# Patient Record
Sex: FEMALE | Race: WHITE | NOT HISPANIC OR LATINO | Employment: FULL TIME | URBAN - NONMETROPOLITAN AREA
[De-identification: names, ages, dates, MRNs, and addresses within clinical notes are randomized per-mention and may not be internally consistent; named-entity substitution may affect disease eponyms.]

---

## 2017-05-18 ENCOUNTER — TRANSCRIBE ORDERS (OUTPATIENT)
Dept: ADMINISTRATIVE | Facility: HOSPITAL | Age: 57
End: 2017-05-18

## 2017-05-18 DIAGNOSIS — M79.604 LEG PAIN, BILATERAL: Primary | ICD-10-CM

## 2017-05-18 DIAGNOSIS — R20.0 LOWER EXTREMITY NUMBNESS: ICD-10-CM

## 2017-05-18 DIAGNOSIS — M79.605 LEG PAIN, BILATERAL: Primary | ICD-10-CM

## 2017-05-22 ENCOUNTER — HOSPITAL ENCOUNTER (OUTPATIENT)
Dept: CARDIOLOGY | Facility: HOSPITAL | Age: 57
Discharge: HOME | End: 2017-05-22
Admitting: NURSE PRACTITIONER

## 2017-05-22 DIAGNOSIS — R20.0 LOWER EXTREMITY NUMBNESS: ICD-10-CM

## 2017-05-22 DIAGNOSIS — M79.605 LEG PAIN, BILATERAL: ICD-10-CM

## 2017-05-22 DIAGNOSIS — M79.604 LEG PAIN, BILATERAL: ICD-10-CM

## 2017-05-22 PROCEDURE — 93925 LOWER EXTREMITY STUDY: CPT | Performed by: RADIOLOGY

## 2017-05-22 PROCEDURE — 93925 LOWER EXTREMITY STUDY: CPT

## 2017-06-26 ENCOUNTER — APPOINTMENT (OUTPATIENT)
Dept: PREADMISSION TESTING | Facility: HOSPITAL | Age: 57
End: 2017-06-26

## 2017-06-26 ENCOUNTER — PREP FOR SURGERY (OUTPATIENT)
Dept: OTHER | Facility: HOSPITAL | Age: 57
End: 2017-06-26

## 2017-06-26 DIAGNOSIS — N81.11 MIDLINE CYSTOCELE: Primary | ICD-10-CM

## 2017-06-26 LAB
ABO GROUP BLD: NORMAL
ALBUMIN SERPL-MCNC: 4.4 G/DL (ref 3.5–5)
ALBUMIN/GLOB SERPL: 1.3 G/DL (ref 1.5–2.5)
ALP SERPL-CCNC: 66 U/L (ref 35–104)
ALT SERPL W P-5'-P-CCNC: 16 U/L (ref 10–36)
ANION GAP SERPL CALCULATED.3IONS-SCNC: 5.8 MMOL/L (ref 3.6–11.2)
AST SERPL-CCNC: 19 U/L (ref 10–30)
BASOPHILS # BLD AUTO: 0.02 10*3/MM3 (ref 0–0.3)
BASOPHILS NFR BLD AUTO: 0.2 % (ref 0–2)
BILIRUB SERPL-MCNC: 0.5 MG/DL (ref 0.2–1.8)
BLD GP AB SCN SERPL QL: NEGATIVE
BUN BLD-MCNC: 10 MG/DL (ref 7–21)
BUN/CREAT SERPL: 13.3 (ref 7–25)
CALCIUM SPEC-SCNC: 9.6 MG/DL (ref 7.7–10)
CHLORIDE SERPL-SCNC: 107 MMOL/L (ref 99–112)
CO2 SERPL-SCNC: 27.2 MMOL/L (ref 24.3–31.9)
CREAT BLD-MCNC: 0.75 MG/DL (ref 0.43–1.29)
DEPRECATED RDW RBC AUTO: 45.3 FL (ref 37–54)
EOSINOPHIL # BLD AUTO: 0.23 10*3/MM3 (ref 0–0.7)
EOSINOPHIL NFR BLD AUTO: 2.5 % (ref 0–5)
ERYTHROCYTE [DISTWIDTH] IN BLOOD BY AUTOMATED COUNT: 14 % (ref 11.5–14.5)
GFR SERPL CREATININE-BSD FRML MDRD: 80 ML/MIN/1.73
GLOBULIN UR ELPH-MCNC: 3.4 GM/DL
GLUCOSE BLD-MCNC: 104 MG/DL (ref 70–110)
HCT VFR BLD AUTO: 39.5 % (ref 37–47)
HGB BLD-MCNC: 12.6 G/DL (ref 12–16)
IMM GRANULOCYTES # BLD: 0.03 10*3/MM3 (ref 0–0.03)
IMM GRANULOCYTES NFR BLD: 0.3 % (ref 0–0.5)
LYMPHOCYTES # BLD AUTO: 2.75 10*3/MM3 (ref 1–3)
LYMPHOCYTES NFR BLD AUTO: 29.8 % (ref 21–51)
MCH RBC QN AUTO: 28.3 PG (ref 27–33)
MCHC RBC AUTO-ENTMCNC: 31.9 G/DL (ref 33–37)
MCV RBC AUTO: 88.6 FL (ref 80–94)
MONOCYTES # BLD AUTO: 0.62 10*3/MM3 (ref 0.1–0.9)
MONOCYTES NFR BLD AUTO: 6.7 % (ref 0–10)
NEUTROPHILS # BLD AUTO: 5.57 10*3/MM3 (ref 1.4–6.5)
NEUTROPHILS NFR BLD AUTO: 60.5 % (ref 30–70)
OSMOLALITY SERPL CALC.SUM OF ELEC: 278.7 MOSM/KG (ref 273–305)
PLATELET # BLD AUTO: 273 10*3/MM3 (ref 130–400)
PMV BLD AUTO: 10.1 FL (ref 6–10)
POTASSIUM BLD-SCNC: 3.6 MMOL/L (ref 3.5–5.3)
PROT SERPL-MCNC: 7.8 G/DL (ref 6–8)
RBC # BLD AUTO: 4.46 10*6/MM3 (ref 4.2–5.4)
RH BLD: POSITIVE
SODIUM BLD-SCNC: 140 MMOL/L (ref 135–153)
WBC NRBC COR # BLD: 9.22 10*3/MM3 (ref 4.5–12.5)

## 2017-06-26 RX ORDER — OMEPRAZOLE 20 MG/1
20 CAPSULE, DELAYED RELEASE ORAL DAILY
COMMUNITY
End: 2018-01-17 | Stop reason: ALTCHOICE

## 2017-06-26 RX ORDER — SODIUM CHLORIDE 0.9 % (FLUSH) 0.9 %
1-10 SYRINGE (ML) INJECTION AS NEEDED
Status: CANCELLED | OUTPATIENT
Start: 2017-06-26

## 2017-06-27 ENCOUNTER — ANESTHESIA (OUTPATIENT)
Dept: PERIOP | Facility: HOSPITAL | Age: 57
End: 2017-06-27

## 2017-06-27 ENCOUNTER — ANESTHESIA EVENT (OUTPATIENT)
Dept: PERIOP | Facility: HOSPITAL | Age: 57
End: 2017-06-27

## 2017-06-27 ENCOUNTER — HOSPITAL ENCOUNTER (OUTPATIENT)
Facility: HOSPITAL | Age: 57
Setting detail: OBSERVATION
Discharge: HOME | End: 2017-06-28
Attending: OBSTETRICS & GYNECOLOGY | Admitting: OBSTETRICS & GYNECOLOGY

## 2017-06-27 ENCOUNTER — APPOINTMENT (OUTPATIENT)
Dept: GENERAL RADIOLOGY | Facility: HOSPITAL | Age: 57
End: 2017-06-27
Attending: OBSTETRICS & GYNECOLOGY

## 2017-06-27 DIAGNOSIS — N81.9 PROLAPSE OF FEMALE PELVIC ORGANS: ICD-10-CM

## 2017-06-27 DIAGNOSIS — N81.11 MIDLINE CYSTOCELE: ICD-10-CM

## 2017-06-27 PROCEDURE — 25010000002 ONDANSETRON PER 1 MG: Performed by: OBSTETRICS & GYNECOLOGY

## 2017-06-27 PROCEDURE — G0378 HOSPITAL OBSERVATION PER HR: HCPCS

## 2017-06-27 PROCEDURE — 25010000002 PROPOFOL 10 MG/ML EMULSION: Performed by: NURSE ANESTHETIST, CERTIFIED REGISTERED

## 2017-06-27 PROCEDURE — 25010000002 KETOROLAC TROMETHAMINE PER 15 MG: Performed by: NURSE ANESTHETIST, CERTIFIED REGISTERED

## 2017-06-27 PROCEDURE — 25010000002 GENTAMICIN PER 80 MG: Performed by: OBSTETRICS & GYNECOLOGY

## 2017-06-27 PROCEDURE — 25010000002 FENTANYL CITRATE (PF) 100 MCG/2ML SOLUTION: Performed by: NURSE ANESTHETIST, CERTIFIED REGISTERED

## 2017-06-27 PROCEDURE — C1771 REP DEV, URINARY, W/SLING: HCPCS | Performed by: OBSTETRICS & GYNECOLOGY

## 2017-06-27 PROCEDURE — 25010000002 HYDROMORPHONE PER 4 MG: Performed by: NURSE ANESTHETIST, CERTIFIED REGISTERED

## 2017-06-27 PROCEDURE — 25010000002 DEXAMETHASONE PER 1 MG: Performed by: NURSE ANESTHETIST, CERTIFIED REGISTERED

## 2017-06-27 PROCEDURE — 25010000002 ONDANSETRON PER 1 MG: Performed by: NURSE ANESTHETIST, CERTIFIED REGISTERED

## 2017-06-27 PROCEDURE — 25010000002 NEOSTIGMINE 10 MG/10ML SOLUTION: Performed by: NURSE ANESTHETIST, CERTIFIED REGISTERED

## 2017-06-27 PROCEDURE — 25010000002 FUROSEMIDE PER 20 MG: Performed by: NURSE ANESTHETIST, CERTIFIED REGISTERED

## 2017-06-27 DEVICE — SLNG TVT EXACT CONTINENCE SYS BX/1EA: Type: IMPLANTABLE DEVICE | Status: FUNCTIONAL

## 2017-06-27 RX ORDER — IPRATROPIUM BROMIDE AND ALBUTEROL SULFATE 2.5; .5 MG/3ML; MG/3ML
3 SOLUTION RESPIRATORY (INHALATION) ONCE AS NEEDED
Status: DISCONTINUED | OUTPATIENT
Start: 2017-06-27 | End: 2017-06-27 | Stop reason: HOSPADM

## 2017-06-27 RX ORDER — FENTANYL CITRATE 50 UG/ML
INJECTION, SOLUTION INTRAMUSCULAR; INTRAVENOUS AS NEEDED
Status: DISCONTINUED | OUTPATIENT
Start: 2017-06-27 | End: 2017-06-27 | Stop reason: SURG

## 2017-06-27 RX ORDER — SODIUM CHLORIDE 9 MG/ML
INJECTION, SOLUTION INTRAVENOUS AS NEEDED
Status: DISCONTINUED | OUTPATIENT
Start: 2017-06-27 | End: 2017-06-27 | Stop reason: HOSPADM

## 2017-06-27 RX ORDER — SODIUM CHLORIDE, SODIUM LACTATE, POTASSIUM CHLORIDE, CALCIUM CHLORIDE 600; 310; 30; 20 MG/100ML; MG/100ML; MG/100ML; MG/100ML
100 INJECTION, SOLUTION INTRAVENOUS CONTINUOUS
Status: DISCONTINUED | OUTPATIENT
Start: 2017-06-27 | End: 2017-06-28 | Stop reason: HOSPADM

## 2017-06-27 RX ORDER — SODIUM CHLORIDE 0.9 % (FLUSH) 0.9 %
1-10 SYRINGE (ML) INJECTION AS NEEDED
Status: DISCONTINUED | OUTPATIENT
Start: 2017-06-27 | End: 2017-06-27 | Stop reason: HOSPADM

## 2017-06-27 RX ORDER — PANTOPRAZOLE SODIUM 40 MG/1
40 TABLET, DELAYED RELEASE ORAL EVERY MORNING
Status: DISCONTINUED | OUTPATIENT
Start: 2017-06-27 | End: 2017-06-28 | Stop reason: HOSPADM

## 2017-06-27 RX ORDER — GLYCOPYRROLATE 0.2 MG/ML
INJECTION INTRAMUSCULAR; INTRAVENOUS AS NEEDED
Status: DISCONTINUED | OUTPATIENT
Start: 2017-06-27 | End: 2017-06-27 | Stop reason: SURG

## 2017-06-27 RX ORDER — ONDANSETRON 2 MG/ML
4 INJECTION INTRAMUSCULAR; INTRAVENOUS EVERY 6 HOURS PRN
Status: DISCONTINUED | OUTPATIENT
Start: 2017-06-27 | End: 2017-06-28 | Stop reason: HOSPADM

## 2017-06-27 RX ORDER — ONDANSETRON 4 MG/1
4 TABLET, ORALLY DISINTEGRATING ORAL EVERY 6 HOURS PRN
Status: DISCONTINUED | OUTPATIENT
Start: 2017-06-27 | End: 2017-06-28 | Stop reason: HOSPADM

## 2017-06-27 RX ORDER — FUROSEMIDE 10 MG/ML
INJECTION INTRAMUSCULAR; INTRAVENOUS AS NEEDED
Status: DISCONTINUED | OUTPATIENT
Start: 2017-06-27 | End: 2017-06-27 | Stop reason: SURG

## 2017-06-27 RX ORDER — OXYCODONE HYDROCHLORIDE AND ACETAMINOPHEN 5; 325 MG/1; MG/1
1 TABLET ORAL ONCE AS NEEDED
Status: DISCONTINUED | OUTPATIENT
Start: 2017-06-27 | End: 2017-06-27 | Stop reason: HOSPADM

## 2017-06-27 RX ORDER — FENTANYL CITRATE 50 UG/ML
50 INJECTION, SOLUTION INTRAMUSCULAR; INTRAVENOUS
Status: DISCONTINUED | OUTPATIENT
Start: 2017-06-27 | End: 2017-06-27 | Stop reason: HOSPADM

## 2017-06-27 RX ORDER — BUPIVACAINE HYDROCHLORIDE AND EPINEPHRINE 5; 5 MG/ML; UG/ML
INJECTION, SOLUTION EPIDURAL; INTRACAUDAL; PERINEURAL AS NEEDED
Status: DISCONTINUED | OUTPATIENT
Start: 2017-06-27 | End: 2017-06-27 | Stop reason: HOSPADM

## 2017-06-27 RX ORDER — ONDANSETRON 2 MG/ML
INJECTION INTRAMUSCULAR; INTRAVENOUS AS NEEDED
Status: DISCONTINUED | OUTPATIENT
Start: 2017-06-27 | End: 2017-06-27 | Stop reason: SURG

## 2017-06-27 RX ORDER — NALOXONE HCL 0.4 MG/ML
0.1 VIAL (ML) INJECTION
Status: DISCONTINUED | OUTPATIENT
Start: 2017-06-27 | End: 2017-06-28 | Stop reason: HOSPADM

## 2017-06-27 RX ORDER — LIDOCAINE HYDROCHLORIDE 20 MG/ML
INJECTION, SOLUTION INFILTRATION; PERINEURAL AS NEEDED
Status: DISCONTINUED | OUTPATIENT
Start: 2017-06-27 | End: 2017-06-27 | Stop reason: SURG

## 2017-06-27 RX ORDER — MAGNESIUM HYDROXIDE 1200 MG/15ML
LIQUID ORAL AS NEEDED
Status: DISCONTINUED | OUTPATIENT
Start: 2017-06-27 | End: 2017-06-27 | Stop reason: HOSPADM

## 2017-06-27 RX ORDER — KETOROLAC TROMETHAMINE 30 MG/ML
15 INJECTION, SOLUTION INTRAMUSCULAR; INTRAVENOUS EVERY 6 HOURS PRN
Status: DISCONTINUED | OUTPATIENT
Start: 2017-06-27 | End: 2017-06-28 | Stop reason: HOSPADM

## 2017-06-27 RX ORDER — NEOSTIGMINE METHYLSULFATE 1 MG/ML
INJECTION, SOLUTION INTRAVENOUS AS NEEDED
Status: DISCONTINUED | OUTPATIENT
Start: 2017-06-27 | End: 2017-06-27 | Stop reason: SURG

## 2017-06-27 RX ORDER — CLINDAMYCIN PHOSPHATE 600 MG/50ML
600 INJECTION INTRAVENOUS ONCE
Status: COMPLETED | OUTPATIENT
Start: 2017-06-27 | End: 2017-06-27

## 2017-06-27 RX ORDER — ROCURONIUM BROMIDE 10 MG/ML
INJECTION, SOLUTION INTRAVENOUS AS NEEDED
Status: DISCONTINUED | OUTPATIENT
Start: 2017-06-27 | End: 2017-06-27 | Stop reason: SURG

## 2017-06-27 RX ORDER — ACETAMINOPHEN 325 MG/1
650 TABLET ORAL EVERY 4 HOURS PRN
Status: DISCONTINUED | OUTPATIENT
Start: 2017-06-27 | End: 2017-06-28 | Stop reason: HOSPADM

## 2017-06-27 RX ORDER — ALBUTEROL SULFATE 90 UG/1
AEROSOL, METERED RESPIRATORY (INHALATION) AS NEEDED
Status: DISCONTINUED | OUTPATIENT
Start: 2017-06-27 | End: 2017-06-27 | Stop reason: SURG

## 2017-06-27 RX ORDER — SODIUM CHLORIDE, SODIUM LACTATE, POTASSIUM CHLORIDE, CALCIUM CHLORIDE 600; 310; 30; 20 MG/100ML; MG/100ML; MG/100ML; MG/100ML
125 INJECTION, SOLUTION INTRAVENOUS CONTINUOUS
Status: DISCONTINUED | OUTPATIENT
Start: 2017-06-27 | End: 2017-06-28 | Stop reason: HOSPADM

## 2017-06-27 RX ORDER — KETOROLAC TROMETHAMINE 30 MG/ML
INJECTION, SOLUTION INTRAMUSCULAR; INTRAVENOUS AS NEEDED
Status: DISCONTINUED | OUTPATIENT
Start: 2017-06-27 | End: 2017-06-27 | Stop reason: SURG

## 2017-06-27 RX ORDER — FAMOTIDINE 10 MG/ML
INJECTION, SOLUTION INTRAVENOUS AS NEEDED
Status: DISCONTINUED | OUTPATIENT
Start: 2017-06-27 | End: 2017-06-27 | Stop reason: SURG

## 2017-06-27 RX ORDER — OXYCODONE HYDROCHLORIDE AND ACETAMINOPHEN 5; 325 MG/1; MG/1
1 TABLET ORAL EVERY 4 HOURS PRN
Status: DISCONTINUED | OUTPATIENT
Start: 2017-06-27 | End: 2017-06-28 | Stop reason: HOSPADM

## 2017-06-27 RX ORDER — ONDANSETRON 2 MG/ML
4 INJECTION INTRAMUSCULAR; INTRAVENOUS ONCE AS NEEDED
Status: DISCONTINUED | OUTPATIENT
Start: 2017-06-27 | End: 2017-06-27 | Stop reason: HOSPADM

## 2017-06-27 RX ORDER — DEXAMETHASONE SODIUM PHOSPHATE 4 MG/ML
INJECTION, SOLUTION INTRA-ARTICULAR; INTRALESIONAL; INTRAMUSCULAR; INTRAVENOUS; SOFT TISSUE AS NEEDED
Status: DISCONTINUED | OUTPATIENT
Start: 2017-06-27 | End: 2017-06-27 | Stop reason: SURG

## 2017-06-27 RX ORDER — MEPERIDINE HYDROCHLORIDE 25 MG/ML
12.5 INJECTION INTRAMUSCULAR; INTRAVENOUS; SUBCUTANEOUS
Status: DISCONTINUED | OUTPATIENT
Start: 2017-06-27 | End: 2017-06-27 | Stop reason: HOSPADM

## 2017-06-27 RX ORDER — HYDROMORPHONE HCL 110MG/55ML
PATIENT CONTROLLED ANALGESIA SYRINGE INTRAVENOUS AS NEEDED
Status: DISCONTINUED | OUTPATIENT
Start: 2017-06-27 | End: 2017-06-27 | Stop reason: SURG

## 2017-06-27 RX ORDER — PROPOFOL 10 MG/ML
VIAL (ML) INTRAVENOUS AS NEEDED
Status: DISCONTINUED | OUTPATIENT
Start: 2017-06-27 | End: 2017-06-27 | Stop reason: SURG

## 2017-06-27 RX ORDER — ONDANSETRON 4 MG/1
4 TABLET, FILM COATED ORAL EVERY 6 HOURS PRN
Status: DISCONTINUED | OUTPATIENT
Start: 2017-06-27 | End: 2017-06-28 | Stop reason: HOSPADM

## 2017-06-27 RX ADMIN — SODIUM CHLORIDE, POTASSIUM CHLORIDE, SODIUM LACTATE AND CALCIUM CHLORIDE: 600; 310; 30; 20 INJECTION, SOLUTION INTRAVENOUS at 11:01

## 2017-06-27 RX ADMIN — DEXAMETHASONE SODIUM PHOSPHATE 8 MG: 4 INJECTION, SOLUTION INTRAMUSCULAR; INTRAVENOUS at 10:23

## 2017-06-27 RX ADMIN — KETOROLAC TROMETHAMINE 30 MG: 30 INJECTION, SOLUTION INTRAMUSCULAR; INTRAVENOUS at 12:14

## 2017-06-27 RX ADMIN — ONDANSETRON 4 MG: 2 INJECTION, SOLUTION INTRAMUSCULAR; INTRAVENOUS at 15:05

## 2017-06-27 RX ADMIN — GENTAMICIN SULFATE 380 MG: 40 INJECTION, SOLUTION INTRAMUSCULAR; INTRAVENOUS at 10:19

## 2017-06-27 RX ADMIN — SODIUM CHLORIDE, POTASSIUM CHLORIDE, SODIUM LACTATE AND CALCIUM CHLORIDE: 600; 310; 30; 20 INJECTION, SOLUTION INTRAVENOUS at 12:05

## 2017-06-27 RX ADMIN — PANTOPRAZOLE SODIUM 40 MG: 40 TABLET, DELAYED RELEASE ORAL at 16:51

## 2017-06-27 RX ADMIN — FUROSEMIDE 20 MG: 10 INJECTION, SOLUTION INTRAMUSCULAR; INTRAVENOUS at 12:08

## 2017-06-27 RX ADMIN — GLYCOPYRROLATE 0.6 MG: 0.2 INJECTION, SOLUTION INTRAMUSCULAR; INTRAVENOUS at 12:19

## 2017-06-27 RX ADMIN — PROPOFOL 200 MG: 10 INJECTION, EMULSION INTRAVENOUS at 10:23

## 2017-06-27 RX ADMIN — HYDROMORPHONE HYDROCHLORIDE 1 MG: 2 INJECTION, SOLUTION INTRAMUSCULAR; INTRAVENOUS; SUBCUTANEOUS at 11:59

## 2017-06-27 RX ADMIN — NEOSTIGMINE METHYLSULFATE 3 MG: 1 INJECTION, SOLUTION INTRAVENOUS at 12:19

## 2017-06-27 RX ADMIN — HYDROMORPHONE HYDROCHLORIDE 0.5 MG: 2 INJECTION, SOLUTION INTRAMUSCULAR; INTRAVENOUS; SUBCUTANEOUS at 12:25

## 2017-06-27 RX ADMIN — FENTANYL CITRATE 100 MCG: 50 INJECTION INTRAMUSCULAR; INTRAVENOUS at 10:19

## 2017-06-27 RX ADMIN — CLINDAMYCIN PHOSPHATE 600 MG: 12 INJECTION, SOLUTION INTRAVENOUS at 10:19

## 2017-06-27 RX ADMIN — FENTANYL CITRATE 50 MCG: 50 INJECTION INTRAMUSCULAR; INTRAVENOUS at 11:59

## 2017-06-27 RX ADMIN — FENTANYL CITRATE 100 MCG: 50 INJECTION INTRAMUSCULAR; INTRAVENOUS at 10:23

## 2017-06-27 RX ADMIN — ROCURONIUM BROMIDE 40 MG: 10 INJECTION INTRAVENOUS at 10:23

## 2017-06-27 RX ADMIN — ALBUTEROL SULFATE 4 PUFF: 90 AEROSOL, METERED RESPIRATORY (INHALATION) at 12:22

## 2017-06-27 RX ADMIN — SODIUM CHLORIDE, POTASSIUM CHLORIDE, SODIUM LACTATE AND CALCIUM CHLORIDE 125 ML/HR: 600; 310; 30; 20 INJECTION, SOLUTION INTRAVENOUS at 09:53

## 2017-06-27 RX ADMIN — LIDOCAINE HYDROCHLORIDE 60 MG: 20 INJECTION, SOLUTION INFILTRATION; PERINEURAL at 10:23

## 2017-06-27 RX ADMIN — ROCURONIUM BROMIDE 10 MG: 10 INJECTION INTRAVENOUS at 11:59

## 2017-06-27 RX ADMIN — HYDROMORPHONE HYDROCHLORIDE 0.5 MG: 2 INJECTION, SOLUTION INTRAMUSCULAR; INTRAVENOUS; SUBCUTANEOUS at 12:21

## 2017-06-27 RX ADMIN — ONDANSETRON 4 MG: 2 INJECTION, SOLUTION INTRAMUSCULAR; INTRAVENOUS at 12:14

## 2017-06-27 RX ADMIN — FLUORESCEIN SODIUM 1 ML: 100 INJECTION INTRAVENOUS at 12:02

## 2017-06-27 RX ADMIN — SODIUM CHLORIDE, POTASSIUM CHLORIDE, SODIUM LACTATE AND CALCIUM CHLORIDE 100 ML/HR: 600; 310; 30; 20 INJECTION, SOLUTION INTRAVENOUS at 14:57

## 2017-06-27 RX ADMIN — FAMOTIDINE 20 MG: 10 INJECTION, SOLUTION INTRAVENOUS at 10:19

## 2017-06-27 NOTE — ANESTHESIA PROCEDURE NOTES
Airway  Urgency: elective    Date/Time: 6/27/2017 10:24 AM  Airway not difficult    General Information and Staff    Patient location during procedure: OR  Anesthesiologist: NORBERT WAGNER  CRNA: DARYL BOB    Indications and Patient Condition  Indications for airway management: airway protection    Preoxygenated: yes  MILS maintained throughout  Mask difficulty assessment: 2 - vent by mask + OA or adjuvant +/- NMBA    Final Airway Details  Final airway type: endotracheal airway      Successful airway: ETT  Cuffed: yes   Successful intubation technique: direct laryngoscopy  Facilitating devices/methods: intubating stylet  Endotracheal tube insertion site: oral  Blade: Florentino  Blade size: #3  ETT size: 7.5 mm  Cormack-Lehane Classification: grade IIa - partial view of glottis  Placement verified by: chest auscultation, capnometry and palpation of cuff   Cuff volume (mL): 6  Measured from: lips  ETT to lips (cm): 22  Number of attempts at approach: 1    Additional Comments  Dentition as preop. No complications noted. Patient tolerated well.  ETT secured.    Patient has chipped front teeth prior to induction.

## 2017-06-27 NOTE — ANESTHESIA PREPROCEDURE EVALUATION
Anesthesia Evaluation     Patient summary reviewed and Nursing notes reviewed   history of anesthetic complications: prolonged sedation  NPO Solid Status: > 8 hours  NPO Liquid Status: > 8 hours     Airway   Mallampati: II  TM distance: >3 FB  Neck ROM: full  no difficulty expected  Dental - normal exam         Pulmonary - normal exam   (+) sleep apnea,   (-) asthma, not a smoker  Cardiovascular - normal exam  Exercise tolerance: good (4-7 METS)    NYHA Classification: II    (+) past MI , dysrhythmias,   (-) hypertension, angina, CHF, hyperlipidemia      Neuro/Psych- negative ROS  (-) seizures, CVA  GI/Hepatic/Renal/Endo    (+) obesity,  GERD well controlled,   (-) diabetes, hypothyroidism    Musculoskeletal     Abdominal  - normal exam    Bowel sounds: normal.   Substance History - negative use     OB/GYN negative ob/gyn ROS         Other   (+) arthritis                                   Anesthesia Plan    ASA 3     general     Anesthetic plan and risks discussed with patient.  Use of blood products discussed with patient  Consented to blood products.

## 2017-06-27 NOTE — ANESTHESIA POSTPROCEDURE EVALUATION
Patient: Brianna Jones    Procedure Summary     Date Anesthesia Start Anesthesia Stop Room / Location    06/27/17 1019 1237 [facility] OR 04 / [facility]       Procedure Diagnosis Surgeon Provider    TOTAL LAPAROSCOPIC HYSTERECTOMY BSO WITH [employer] [employer] SI ROBOT (N/A Abdomen); ANTERIOR  REPAIR (N/A Vagina); RETROPUBIC TENSION FREE VAGINAL TAPING (N/A Vagina) (R32) MD Salo Tovar MD          Anesthesia Type: general  Last vitals  /69 (06/27/17 1303)    Temp 97.4 °F (36.3 °C) (06/27/17 1303)    Pulse 60 (06/27/17 1303)   Resp 13 (06/27/17 1303)    SpO2 97 % (06/27/17 1303)      Post Anesthesia Care and Evaluation    Patient location during evaluation: PACU  Patient participation: complete - patient participated  Level of consciousness: awake and alert  Pain score: 1  Pain management: adequate  Airway patency: patent  Anesthetic complications: No anesthetic complications  PONV Status: controlled  Cardiovascular status: acceptable  Respiratory status: acceptable  Hydration status: acceptable

## 2017-06-28 VITALS
SYSTOLIC BLOOD PRESSURE: 149 MMHG | TEMPERATURE: 98.9 F | WEIGHT: 230 LBS | BODY MASS INDEX: 38.32 KG/M2 | RESPIRATION RATE: 20 BRPM | HEART RATE: 71 BPM | OXYGEN SATURATION: 97 % | DIASTOLIC BLOOD PRESSURE: 83 MMHG | HEIGHT: 65 IN

## 2017-06-28 LAB
DEPRECATED RDW RBC AUTO: 43.4 FL (ref 37–54)
ERYTHROCYTE [DISTWIDTH] IN BLOOD BY AUTOMATED COUNT: 13.8 % (ref 11.5–14.5)
HCT VFR BLD AUTO: 36.5 % (ref 37–47)
HGB BLD-MCNC: 11.7 G/DL (ref 12–16)
MCH RBC QN AUTO: 28.3 PG (ref 27–33)
MCHC RBC AUTO-ENTMCNC: 32.1 G/DL (ref 33–37)
MCV RBC AUTO: 88.2 FL (ref 80–94)
PLATELET # BLD AUTO: 265 10*3/MM3 (ref 130–400)
PMV BLD AUTO: 9.9 FL (ref 6–10)
RBC # BLD AUTO: 4.14 10*6/MM3 (ref 4.2–5.4)
WBC NRBC COR # BLD: 18.22 10*3/MM3 (ref 4.5–12.5)

## 2017-06-28 PROCEDURE — G0378 HOSPITAL OBSERVATION PER HR: HCPCS

## 2017-06-28 PROCEDURE — 94799 UNLISTED PULMONARY SVC/PX: CPT

## 2017-06-28 PROCEDURE — 85027 COMPLETE CBC AUTOMATED: CPT | Performed by: OBSTETRICS & GYNECOLOGY

## 2017-06-28 RX ORDER — ESTRADIOL 2 MG/1
2 TABLET ORAL DAILY
Qty: 100 TABLET | Refills: 3 | Status: SHIPPED | OUTPATIENT
Start: 2017-06-28 | End: 2018-01-17 | Stop reason: ALTCHOICE

## 2017-06-28 RX ORDER — IBUPROFEN 600 MG/1
600 TABLET ORAL EVERY 6 HOURS PRN
Qty: 30 TABLET | Refills: 0 | Status: SHIPPED | OUTPATIENT
Start: 2017-06-28 | End: 2017-07-28

## 2017-06-28 RX ORDER — NITROFURANTOIN MACROCRYSTALS 100 MG/1
100 CAPSULE ORAL 2 TIMES DAILY
Qty: 14 CAPSULE | Refills: 0 | Status: SHIPPED | OUTPATIENT
Start: 2017-06-28 | End: 2017-07-05

## 2017-06-28 RX ORDER — DOCUSATE SODIUM 100 MG/1
100 CAPSULE, LIQUID FILLED ORAL 2 TIMES DAILY
Qty: 60 CAPSULE | Refills: 0 | Status: SHIPPED | OUTPATIENT
Start: 2017-06-28 | End: 2017-07-28

## 2017-07-03 LAB
LAB AP CASE REPORT: NORMAL
Lab: NORMAL
PATH REPORT.FINAL DX SPEC: NORMAL

## 2017-07-12 ENCOUNTER — TRANSCRIBE ORDERS (OUTPATIENT)
Dept: ADMINISTRATIVE | Facility: HOSPITAL | Age: 57
End: 2017-07-12

## 2017-07-12 DIAGNOSIS — R10.9 RIGHT FLANK PAIN: Primary | ICD-10-CM

## 2017-07-14 ENCOUNTER — HOSPITAL ENCOUNTER (OUTPATIENT)
Dept: CT IMAGING | Facility: HOSPITAL | Age: 57
Discharge: HOME | End: 2017-07-14
Attending: OBSTETRICS & GYNECOLOGY

## 2017-07-14 DIAGNOSIS — R10.9 RIGHT FLANK PAIN: ICD-10-CM

## 2017-09-19 ENCOUNTER — TRANSCRIBE ORDERS (OUTPATIENT)
Dept: ADMINISTRATIVE | Facility: HOSPITAL | Age: 57
End: 2017-09-19

## 2017-09-19 DIAGNOSIS — R31.9 HEMATURIA: Primary | ICD-10-CM

## 2017-09-20 ENCOUNTER — TRANSCRIBE ORDERS (OUTPATIENT)
Dept: ADMINISTRATIVE | Facility: HOSPITAL | Age: 57
End: 2017-09-20

## 2017-09-20 ENCOUNTER — HOSPITAL ENCOUNTER (OUTPATIENT)
Dept: GENERAL RADIOLOGY | Facility: HOSPITAL | Age: 57
Discharge: HOME | End: 2017-09-20
Admitting: NURSE PRACTITIONER

## 2017-09-20 DIAGNOSIS — M25.561 RIGHT KNEE PAIN, UNSPECIFIED CHRONICITY: ICD-10-CM

## 2017-09-20 DIAGNOSIS — M25.561 RIGHT KNEE PAIN, UNSPECIFIED CHRONICITY: Primary | ICD-10-CM

## 2017-09-20 PROCEDURE — 73562 X-RAY EXAM OF KNEE 3: CPT | Performed by: RADIOLOGY

## 2017-09-20 PROCEDURE — 73562 X-RAY EXAM OF KNEE 3: CPT

## 2017-09-27 ENCOUNTER — OFFICE VISIT (OUTPATIENT)
Dept: UROLOGY | Facility: CLINIC | Age: 57
End: 2017-09-27

## 2017-09-27 DIAGNOSIS — R31.9 HEMATURIA: Primary | ICD-10-CM

## 2017-09-27 DIAGNOSIS — R10.31 RIGHT LOWER QUADRANT PAIN: ICD-10-CM

## 2017-09-27 LAB
ANION GAP SERPL CALCULATED.3IONS-SCNC: 7.3 MMOL/L (ref 3.6–11.2)
BILIRUB BLD-MCNC: NEGATIVE MG/DL
BUN BLD-MCNC: 13 MG/DL (ref 7–21)
BUN/CREAT SERPL: 15.7 (ref 7–25)
CALCIUM SPEC-SCNC: 9.7 MG/DL (ref 7.7–10)
CHLORIDE SERPL-SCNC: 111 MMOL/L (ref 99–112)
CLARITY, POC: CLEAR
CO2 SERPL-SCNC: 23.7 MMOL/L (ref 24.3–31.9)
COLOR UR: YELLOW
CREAT BLD-MCNC: 0.83 MG/DL (ref 0.43–1.29)
GFR SERPL CREATININE-BSD FRML MDRD: 71 ML/MIN/1.73
GLUCOSE BLD-MCNC: 98 MG/DL (ref 70–110)
GLUCOSE UR STRIP-MCNC: NEGATIVE MG/DL
KETONES UR QL: NEGATIVE
LEUKOCYTE EST, POC: ABNORMAL
NITRITE UR-MCNC: NEGATIVE MG/ML
OSMOLALITY SERPL CALC.SUM OF ELEC: 283.2 MOSM/KG (ref 273–305)
PH UR: 5 [PH] (ref 5–8)
POTASSIUM BLD-SCNC: 4.1 MMOL/L (ref 3.5–5.3)
PROT UR STRIP-MCNC: ABNORMAL MG/DL
RBC # UR STRIP: ABNORMAL /UL
SODIUM BLD-SCNC: 142 MMOL/L (ref 135–153)
SP GR UR: 1.02 (ref 1–1.03)
UROBILINOGEN UR QL: NORMAL

## 2017-09-27 PROCEDURE — 99214 OFFICE O/P EST MOD 30 MIN: CPT | Performed by: NURSE PRACTITIONER

## 2017-09-27 PROCEDURE — 81003 URINALYSIS AUTO W/O SCOPE: CPT | Performed by: NURSE PRACTITIONER

## 2017-09-27 PROCEDURE — 36415 COLL VENOUS BLD VENIPUNCTURE: CPT | Performed by: NURSE PRACTITIONER

## 2017-09-27 PROCEDURE — 51798 US URINE CAPACITY MEASURE: CPT | Performed by: NURSE PRACTITIONER

## 2017-09-27 PROCEDURE — 80048 BASIC METABOLIC PNL TOTAL CA: CPT | Performed by: NURSE PRACTITIONER

## 2017-09-27 RX ORDER — LISINOPRIL 10 MG/1
TABLET ORAL
COMMUNITY
Start: 2017-09-19 | End: 2018-01-17 | Stop reason: ALTCHOICE

## 2017-09-27 NOTE — PROGRESS NOTES
Chief Complaint:          Chief Complaint   Patient presents with   • Blood in Urine       HPI:   56 y.o. female being seen in the office today for microhematuria that has been present for the past x 6+ months.  Denies any history of stones in herself or her family.  States her grandmother had colon cancer.  Non-smoker.  Does complain of some right lower quadrant tenderness.  Has had several urine cultures by her PCP for the microhematuria with negative results.  UA today shows small amount of leukocytes and small amount of blood.  She has never had any urological work-up or imaging completed for the microhematuria.  PVR today is 0 mL.    HPI        Past Medical History:        Past Medical History:   Diagnosis Date   • Arthritis    • Constipation    • Frequency of urination    • GERD (gastroesophageal reflux disease)    • Tremor          Current Meds:     Current Outpatient Prescriptions   Medication Sig Dispense Refill   • lisinopril (PRINIVIL,ZESTRIL) 10 MG tablet      • omeprazole (priLOSEC) 20 MG capsule Take 20 mg by mouth Daily.     • estradiol (ESTRACE) 2 MG tablet Take 1 tablet by mouth Daily. 100 tablet 3   • HYDROcodone-acetaminophen (NORCO) 5-325 MG per tablet Take 1 to 2 tablets by mouth Every 4 (Four) Hours As Needed. 40 tablet 0     No current facility-administered medications for this visit.         Allergies:      Allergies   Allergen Reactions   • Amoxicillin Dermatitis   • Penicillins    • Sulfa Antibiotics         Past Surgical History:     Past Surgical History:   Procedure Laterality Date   • ABDOMINAL SURGERY     • ANTERIOR VAGINAL REPAIR N/A 6/27/2017    Procedure: ANTERIOR  REPAIR;  Surgeon: Jose Ellison MD;  Location: [facility];  Service:    • BREAST SURGERY Right    • LAPAROSCOPIC CHOLECYSTECTOMY     • LAPAROSCOPIC TUBAL LIGATION     • TONSILLECTOMY     • TOTAL LAPAROSCOPIC HYSTERECTOMY N/A 6/27/2017    Procedure: TOTAL LAPAROSCOPIC HYSTERECTOMY BSO WITH DAVINCI SI ROBOT;  Surgeon:  Jose Ellison MD;  Location: [facility];  Service:    • TRANSVAGINAL TAPING SUSPENSION N/A 6/27/2017    Procedure: RETROPUBIC TENSION FREE VAGINAL TAPING;  Surgeon: Jose Ellison MD;  Location: [facility];  Service:          Social History:     Social History     Social History   • Marital status:      Spouse name: N/A   • Number of children: N/A   • Years of education: N/A     Occupational History   • Not on file.     Social History Main Topics   • Smoking status: Never Smoker   • Smokeless tobacco: Never Used   • Alcohol use No   • Drug use: No   • Sexual activity: Defer     Other Topics Concern   • Not on file     Social History Narrative       Family History:     No family history on file.    Review of Systems:     Review of Systems   Constitutional: Negative for chills, fatigue and fever.   Respiratory: Negative for cough, shortness of breath and wheezing.    Cardiovascular: Negative for leg swelling.   Gastrointestinal: Positive for abdominal pain. Negative for nausea and vomiting.   Genitourinary: Positive for hematuria.   Musculoskeletal: Positive for joint swelling. Negative for back pain.   Neurological: Positive for dizziness. Negative for headaches.   Psychiatric/Behavioral: Negative for confusion.       Physical Exam:     Physical Exam   Constitutional: She is oriented to person, place, and time. She appears well-developed and well-nourished. No distress.   Abdominal: Soft. Bowel sounds are normal. She exhibits no distension and no mass. There is no tenderness. There is no rebound and no guarding. No hernia.   Genitourinary: Vagina normal.   Genitourinary Comments: Status post hysterectomy   Neurological: She is alert and oriented to person, place, and time.   Skin: Skin is warm and dry. No rash noted. She is not diaphoretic. No erythema. No pallor.   Psychiatric: She has a normal mood and affect. Her behavior is normal. Judgment and thought content normal.   Nursing note and vitals  reviewed.      Procedure:     No notes on file      Assessment:     Encounter Diagnosis   Name Primary?   • Hematuria Yes     Orders Placed This Encounter   Procedures   • Bladder Scan   • POC Urinalysis Dipstick, Automated       Plan:   Recommend she have a CT scan of the abdomen and pelvis with and without contrast on a renal mass protocol and a cystoscopy by Dr. Fernando or Dr. Castañeda to evaluate the cause of the persistent microhematuria.  Discussed the procedures with the patient and she is agreeable.  She will be seen in follow-up after the procedures.    Counseling was given to patient for the following topics diagnostic results, patient and family education, impressions and risks and benefits of treatment options. and the interim medical history and current results were reviewed.  A treatment plan with follow-up was made. Total time of the encounter was 28 minutes and 28 minutes were spent discussing Hematuria [R31.9] face-to-face.       This document has been electronically signed by STEPHEN Zuniga September 27, 2017 2:04 PM

## 2017-09-28 ENCOUNTER — APPOINTMENT (OUTPATIENT)
Dept: ULTRASOUND IMAGING | Facility: HOSPITAL | Age: 57
End: 2017-09-28

## 2017-10-04 ENCOUNTER — HOSPITAL ENCOUNTER (OUTPATIENT)
Dept: ULTRASOUND IMAGING | Facility: HOSPITAL | Age: 57
Discharge: HOME | End: 2017-10-04
Admitting: NURSE PRACTITIONER

## 2017-10-04 DIAGNOSIS — R31.9 HEMATURIA: ICD-10-CM

## 2017-10-04 PROCEDURE — 76775 US EXAM ABDO BACK WALL LIM: CPT | Performed by: RADIOLOGY

## 2017-10-04 PROCEDURE — 76775 US EXAM ABDO BACK WALL LIM: CPT

## 2017-10-12 ENCOUNTER — APPOINTMENT (OUTPATIENT)
Dept: CT IMAGING | Facility: HOSPITAL | Age: 57
End: 2017-10-12

## 2017-10-18 ENCOUNTER — OFFICE VISIT (OUTPATIENT)
Dept: ORTHOPEDIC SURGERY | Facility: CLINIC | Age: 57
End: 2017-10-18

## 2017-10-18 VITALS — HEIGHT: 65 IN | BODY MASS INDEX: 36.65 KG/M2 | WEIGHT: 220 LBS

## 2017-10-18 DIAGNOSIS — M17.0 PRIMARY OSTEOARTHRITIS OF BOTH KNEES: Primary | ICD-10-CM

## 2017-10-18 PROCEDURE — 99203 OFFICE O/P NEW LOW 30 MIN: CPT | Performed by: ORTHOPAEDIC SURGERY

## 2017-10-18 PROCEDURE — 20610 DRAIN/INJ JOINT/BURSA W/O US: CPT | Performed by: ORTHOPAEDIC SURGERY

## 2017-10-18 RX ADMIN — LIDOCAINE HYDROCHLORIDE 2 ML: 20 INJECTION, SOLUTION INFILTRATION; PERINEURAL at 13:43

## 2017-10-18 RX ADMIN — METHYLPREDNISOLONE ACETATE 40 MG: 40 INJECTION, SUSPENSION INTRA-ARTICULAR; INTRALESIONAL; INTRAMUSCULAR; SOFT TISSUE at 13:43

## 2017-10-18 NOTE — PROGRESS NOTES
New Patient Visit        Patient: Brianna Jones  YOB: 1960  Date of encounter: 10/18/2017      History of Present Illness:   Brianna Jones is a 56 y.o.  female who is referred here by STEPHEN Cardenas for evaluation of bilateral knee pain.  She states the left knee is significantly worse than the right and been ongoing for several years.  She states is been getting more constant and progressively worse in the last several months.  She denies injury to the knee.  She complains of constant aching pain generalized throughout the knee.  She states her symptoms are worse with prolonged walking and standing, as well as stairs and squatting.  She is never taken any oral anti-inflammatories or ever had an injection.    PMH:   Patient Active Problem List   Diagnosis   • Midline cystocele     Past Medical History:   Diagnosis Date   • Arthritis    • Constipation    • Frequency of urination    • GERD (gastroesophageal reflux disease)    • Tremor        PSH:  Past Surgical History:   Procedure Laterality Date   • ABDOMINAL SURGERY     • ANTERIOR VAGINAL REPAIR N/A 6/27/2017    Procedure: ANTERIOR  REPAIR;  Surgeon: Jose Ellison MD;  Location: [facility] OR;  Service:    • BREAST SURGERY Right    • LAPAROSCOPIC CHOLECYSTECTOMY     • LAPAROSCOPIC TUBAL LIGATION     • TONSILLECTOMY     • TOTAL LAPAROSCOPIC HYSTERECTOMY N/A 6/27/2017    Procedure: TOTAL LAPAROSCOPIC HYSTERECTOMY BSO WITH DAVINCI SI ROBOT;  Surgeon: Jose Ellison MD;  Location: [facility] OR;  Service:    • TRANSVAGINAL TAPING SUSPENSION N/A 6/27/2017    Procedure: RETROPUBIC TENSION FREE VAGINAL TAPING;  Surgeon: Jose Ellison MD;  Location: [facility] OR;  Service:        Allergies:     Allergies   Allergen Reactions   • Amoxicillin Dermatitis   • Penicillins    • Sulfa Antibiotics        Medications:     Current Outpatient Prescriptions:   •  estradiol (ESTRACE) 2 MG tablet, Take 1 tablet by mouth Daily., Disp: 100 tablet, Rfl: 3  •   "lisinopril (PRINIVIL,ZESTRIL) 10 MG tablet, , Disp: , Rfl:   •  omeprazole (priLOSEC) 20 MG capsule, Take 20 mg by mouth Daily., Disp: , Rfl:     Social History:  Social History     Social History   • Marital status:      Spouse name: N/A   • Number of children: N/A   • Years of education: N/A     Occupational History   • Not on file.     Social History Main Topics   • Smoking status: Never Smoker   • Smokeless tobacco: Never Used   • Alcohol use No   • Drug use: No   • Sexual activity: Defer     Other Topics Concern   • Not on file     Social History Narrative       Family History:   History reviewed. No pertinent family history.    Review of Systems:   Review of Systems   Constitutional: Negative.    HENT: Negative.    Eyes: Negative.    Respiratory: Negative.    Cardiovascular: Negative.    Gastrointestinal: Negative.    Endocrine: Negative.    Genitourinary: Negative.    Musculoskeletal:        Pertinent positives mentioned in HPI   Skin: Negative.    Neurological: Negative.    Hematological: Negative.    Psychiatric/Behavioral: Negative.        Physical Exam: 56 y.o. female  General Appearance:    Alert and oriented x 3, cooperative, in no acute distress                   Vitals:    10/18/17 1300   Weight: 220 lb (99.8 kg)   Height: 65\" (165.1 cm)                Musculoskeletal: Examination of the bilateral knees reveals no effusion.  She has moderate medial joint line tenderness.  She has full range of motion with moderate crepitus of the left knee.  There is no instability with varus or valgus stressing.  Her neurovascular status is intact.    Radiology:     4 views of the bilateral knees were reviewed revealing medial compartment joint space narrowing with osteophyte formation.    Large Joint Arthrocentesis  Date/Time: 10/18/2017 1:43 PM  Consent given by: patient  Timeout: Immediately prior to procedure a time out was called to verify the correct patient, procedure, equipment, support staff and " site/side marked as required   Supporting Documentation  Indications: pain   Procedure Details  Location: knee - L knee  Needle size: 25 G  Approach: anterolateral  Medications administered: 40 mg methylPREDNISolone acetate 40 MG/ML; 2 mL lidocaine 2%  Patient tolerance: patient tolerated the procedure well with no immediate complications          Assessment    ICD-10-CM ICD-9-CM   1. Primary osteoarthritis of both knees M17.0 715.16       Plan:   A 56-year-old female with complaints of bilateral knee pain.  X-rays were reviewed today and although they are nonweightbearing films there does appear to be narrowing especially within the medial compartment.  There is also findings of arthritis within the patellofemoral joint.  She's never had any conservative treatment.  Given this today we will begin with intra-articular steroid injection.  Her left knee is currently more symptomatic than the right in today proceeded with 40 mg Depo-Medrol with lidocaine block intra-articularly into the left knee.  Monitor response the injection and she'll return back with recurrence of pain.  If she responds well to the cortisone she would likely.  Good candidate for viscous supplementation.    Written by, Bree VANN, acting as a scribe for STEPHEN Dos Santos

## 2017-10-19 RX ORDER — METHYLPREDNISOLONE ACETATE 40 MG/ML
40 INJECTION, SUSPENSION INTRA-ARTICULAR; INTRALESIONAL; INTRAMUSCULAR; SOFT TISSUE
Status: COMPLETED | OUTPATIENT
Start: 2017-10-18 | End: 2017-10-18

## 2017-10-19 RX ORDER — LIDOCAINE HYDROCHLORIDE 20 MG/ML
2 INJECTION, SOLUTION INFILTRATION; PERINEURAL
Status: COMPLETED | OUTPATIENT
Start: 2017-10-18 | End: 2017-10-18

## 2017-10-24 ENCOUNTER — HOSPITAL ENCOUNTER (OUTPATIENT)
Dept: CT IMAGING | Facility: HOSPITAL | Age: 57
Discharge: HOME | End: 2017-10-24
Admitting: NURSE PRACTITIONER

## 2017-10-24 DIAGNOSIS — R10.31 RIGHT LOWER QUADRANT PAIN: ICD-10-CM

## 2017-10-24 DIAGNOSIS — R31.9 HEMATURIA: ICD-10-CM

## 2017-10-24 PROCEDURE — 74178 CT ABD&PLV WO CNTR FLWD CNTR: CPT

## 2017-10-24 PROCEDURE — 0 IOPAMIDOL 61 % SOLUTION: Performed by: NURSE PRACTITIONER

## 2017-10-24 PROCEDURE — 74178 CT ABD&PLV WO CNTR FLWD CNTR: CPT | Performed by: RADIOLOGY

## 2017-10-24 RX ADMIN — IOPAMIDOL 100 ML: 612 INJECTION, SOLUTION INTRAVENOUS at 14:00

## 2017-11-16 ENCOUNTER — PROCEDURE VISIT (OUTPATIENT)
Dept: UROLOGY | Facility: CLINIC | Age: 57
End: 2017-11-16

## 2017-11-16 DIAGNOSIS — Z48.816 AFTERCARE FOLLOWING SURGERY OF THE GENITOURINARY SYSTEM: Primary | ICD-10-CM

## 2017-11-16 DIAGNOSIS — R35.0 FREQUENCY OF URINATION: ICD-10-CM

## 2017-11-16 LAB
BILIRUB BLD-MCNC: NEGATIVE MG/DL
CLARITY, POC: CLEAR
COLOR UR: YELLOW
GLUCOSE UR STRIP-MCNC: NEGATIVE MG/DL
KETONES UR QL: NEGATIVE
LEUKOCYTE EST, POC: ABNORMAL
NITRITE UR-MCNC: NEGATIVE MG/ML
PH UR: 5 [PH] (ref 5–8)
PROT UR STRIP-MCNC: NEGATIVE MG/DL
RBC # UR STRIP: ABNORMAL /UL
SP GR UR: 1.02 (ref 1–1.03)
UROBILINOGEN UR QL: NORMAL

## 2017-11-16 PROCEDURE — 99212 OFFICE O/P EST SF 10 MIN: CPT | Performed by: UROLOGY

## 2017-11-16 PROCEDURE — 81002 URINALYSIS NONAUTO W/O SCOPE: CPT | Performed by: UROLOGY

## 2017-11-16 PROCEDURE — 96372 THER/PROPH/DIAG INJ SC/IM: CPT | Performed by: UROLOGY

## 2017-11-16 PROCEDURE — 52000 CYSTOURETHROSCOPY: CPT | Performed by: UROLOGY

## 2017-11-16 RX ORDER — GENTAMICIN SULFATE 40 MG/ML
80 INJECTION, SOLUTION INTRAMUSCULAR; INTRAVENOUS ONCE
Status: COMPLETED | OUTPATIENT
Start: 2017-11-16 | End: 2017-11-16

## 2017-11-16 RX ADMIN — GENTAMICIN SULFATE 80 MG: 40 INJECTION, SOLUTION INTRAMUSCULAR; INTRAVENOUS at 14:51

## 2017-11-16 NOTE — PROGRESS NOTES
Chief Complaint:       No chief complaint on file.          HPI:       HPI  Pt is here for cystoscopy.  Her ct scan only showed a small left simple renal cyst.  No stones, hydronephrosis or masses.      PMI:      Past Medical History:   Diagnosis Date   • Arthritis    • Constipation    • Frequency of urination    • GERD (gastroesophageal reflux disease)    • Tremor            Medications:        Current Outpatient Prescriptions:   •  estradiol (ESTRACE) 2 MG tablet, Take 1 tablet by mouth Daily., Disp: 100 tablet, Rfl: 3  •  lisinopril (PRINIVIL,ZESTRIL) 10 MG tablet, , Disp: , Rfl:   •  omeprazole (priLOSEC) 20 MG capsule, Take 20 mg by mouth Daily., Disp: , Rfl:     Current Facility-Administered Medications:   •  gentamicin (GARAMYCIN) injection 80 mg, 80 mg, Intramuscular, Once, Matteo Fernando MD        Allergies:      Allergies   Allergen Reactions   • Amoxicillin Dermatitis   • Penicillins    • Sulfa Antibiotics            Past Surgical Histroy:      Past Surgical History:   Procedure Laterality Date   • ABDOMINAL SURGERY     • ANTERIOR VAGINAL REPAIR N/A 6/27/2017    Procedure: ANTERIOR  REPAIR;  Surgeon: Jose Ellison MD;  Location: [facility];  Service:    • BREAST SURGERY Right    • LAPAROSCOPIC CHOLECYSTECTOMY     • LAPAROSCOPIC TUBAL LIGATION     • TONSILLECTOMY     • TOTAL LAPAROSCOPIC HYSTERECTOMY N/A 6/27/2017    Procedure: TOTAL LAPAROSCOPIC HYSTERECTOMY BSO WITH DAVINCI SI ROBOT;  Surgeon: Jose Ellison MD;  Location: [facility];  Service:    • TRANSVAGINAL TAPING SUSPENSION N/A 6/27/2017    Procedure: RETROPUBIC TENSION FREE VAGINAL TAPING;  Surgeon: Jose Ellison MD;  Location: [facility];  Service:            Social History:      Social History     Social History   • Marital status:      Spouse name: N/A   • Number of children: N/A   • Years of education: N/A     Occupational History   • Not on file.     Social History Main Topics   • Smoking status: Never Smoker   •  Smokeless tobacco: Never Used   • Alcohol use No   • Drug use: No   • Sexual activity: Defer     Other Topics Concern   • Not on file     Social History Narrative           Family History:      No family history on file.        Review of Symptoms:      Genito-Urinary ROS: negative        Physical Exam:      Physical Exam        Procedure:      Procedure: Cystoscopy Female    Indication: microhematuria    Urinalysis Performed Today:  Negative for Infection    Premedication:none    Informed Consent Obtained    Sterile prep performed in usual fashion    6 cc of topical lidocaine inserted urethrally    Flexible cystoscope inserted and bladder examined    Findings: normal: Urethra without lesions, Bladder mucosa without tumors or lesions, No stones seen, ureteral orifices are in orthotopic position and size.    Additional Procedure with Cystoscopy: none    Discussion:      Will have her see us in a year to check her ua.    Counseling was given to patient for the following topics diagnostic results . Total time of the encounter was 1212 minutes spent in face-to-face discussion.

## 2017-12-06 ENCOUNTER — APPOINTMENT (OUTPATIENT)
Dept: CT IMAGING | Facility: HOSPITAL | Age: 57
End: 2017-12-06

## 2017-12-06 ENCOUNTER — HOSPITAL ENCOUNTER (EMERGENCY)
Facility: HOSPITAL | Age: 57
Discharge: HOME | End: 2017-12-06
Attending: EMERGENCY MEDICINE | Admitting: EMERGENCY MEDICINE

## 2017-12-06 VITALS
WEIGHT: 235 LBS | TEMPERATURE: 97.4 F | HEIGHT: 65 IN | SYSTOLIC BLOOD PRESSURE: 128 MMHG | DIASTOLIC BLOOD PRESSURE: 79 MMHG | RESPIRATION RATE: 18 BRPM | BODY MASS INDEX: 39.15 KG/M2 | HEART RATE: 76 BPM | OXYGEN SATURATION: 100 %

## 2017-12-06 DIAGNOSIS — R42 DIZZINESS: Primary | ICD-10-CM

## 2017-12-06 LAB
ALBUMIN SERPL-MCNC: 4.3 G/DL (ref 3.5–5)
ALBUMIN/GLOB SERPL: 1.4 G/DL (ref 1.5–2.5)
ALP SERPL-CCNC: 70 U/L (ref 35–104)
ALT SERPL W P-5'-P-CCNC: 22 U/L (ref 10–36)
ANION GAP SERPL CALCULATED.3IONS-SCNC: 9.1 MMOL/L (ref 3.6–11.2)
AST SERPL-CCNC: 19 U/L (ref 10–30)
BASOPHILS # BLD AUTO: 0.02 10*3/MM3 (ref 0–0.3)
BASOPHILS NFR BLD AUTO: 0.2 % (ref 0–2)
BILIRUB SERPL-MCNC: 0.3 MG/DL (ref 0.2–1.8)
BILIRUB UR QL STRIP: NEGATIVE
BUN BLD-MCNC: 16 MG/DL (ref 7–21)
BUN/CREAT SERPL: 17 (ref 7–25)
CALCIUM SPEC-SCNC: 9.2 MG/DL (ref 7.7–10)
CHLORIDE SERPL-SCNC: 104 MMOL/L (ref 99–112)
CLARITY UR: CLEAR
CO2 SERPL-SCNC: 24.9 MMOL/L (ref 24.3–31.9)
COLOR UR: YELLOW
CREAT BLD-MCNC: 0.94 MG/DL (ref 0.43–1.29)
DEPRECATED RDW RBC AUTO: 47.7 FL (ref 37–54)
EOSINOPHIL # BLD AUTO: 0.22 10*3/MM3 (ref 0–0.7)
EOSINOPHIL NFR BLD AUTO: 2 % (ref 0–5)
ERYTHROCYTE [DISTWIDTH] IN BLOOD BY AUTOMATED COUNT: 14.7 % (ref 11.5–14.5)
GFR SERPL CREATININE-BSD FRML MDRD: 62 ML/MIN/1.73
GLOBULIN UR ELPH-MCNC: 3.1 GM/DL
GLUCOSE BLD-MCNC: 97 MG/DL (ref 70–110)
GLUCOSE UR STRIP-MCNC: NEGATIVE MG/DL
HCT VFR BLD AUTO: 39.9 % (ref 37–47)
HGB BLD-MCNC: 12.6 G/DL (ref 12–16)
HGB UR QL STRIP.AUTO: NEGATIVE
IMM GRANULOCYTES # BLD: 0.03 10*3/MM3 (ref 0–0.03)
IMM GRANULOCYTES NFR BLD: 0.3 % (ref 0–0.5)
KETONES UR QL STRIP: NEGATIVE
LEUKOCYTE ESTERASE UR QL STRIP.AUTO: NEGATIVE
LYMPHOCYTES # BLD AUTO: 2.63 10*3/MM3 (ref 1–3)
LYMPHOCYTES NFR BLD AUTO: 23.5 % (ref 21–51)
MAGNESIUM SERPL-MCNC: 2.1 MG/DL (ref 1.7–2.6)
MCH RBC QN AUTO: 28.1 PG (ref 27–33)
MCHC RBC AUTO-ENTMCNC: 31.6 G/DL (ref 33–37)
MCV RBC AUTO: 89.1 FL (ref 80–94)
MONOCYTES # BLD AUTO: 0.81 10*3/MM3 (ref 0.1–0.9)
MONOCYTES NFR BLD AUTO: 7.2 % (ref 0–10)
NEUTROPHILS # BLD AUTO: 7.5 10*3/MM3 (ref 1.4–6.5)
NEUTROPHILS NFR BLD AUTO: 66.8 % (ref 30–70)
NITRITE UR QL STRIP: NEGATIVE
OSMOLALITY SERPL CALC.SUM OF ELEC: 276.8 MOSM/KG (ref 273–305)
PH UR STRIP.AUTO: <=5 [PH] (ref 5–8)
PLATELET # BLD AUTO: 289 10*3/MM3 (ref 130–400)
PMV BLD AUTO: 9.6 FL (ref 6–10)
POTASSIUM BLD-SCNC: 4.1 MMOL/L (ref 3.5–5.3)
PROT SERPL-MCNC: 7.4 G/DL (ref 6–8)
PROT UR QL STRIP: NEGATIVE
RBC # BLD AUTO: 4.48 10*6/MM3 (ref 4.2–5.4)
SODIUM BLD-SCNC: 138 MMOL/L (ref 135–153)
SP GR UR STRIP: 1.01 (ref 1–1.03)
UROBILINOGEN UR QL STRIP: NORMAL
WBC NRBC COR # BLD: 11.21 10*3/MM3 (ref 4.5–12.5)

## 2017-12-06 PROCEDURE — 99283 EMERGENCY DEPT VISIT LOW MDM: CPT

## 2017-12-06 PROCEDURE — 80053 COMPREHEN METABOLIC PANEL: CPT | Performed by: EMERGENCY MEDICINE

## 2017-12-06 PROCEDURE — 83735 ASSAY OF MAGNESIUM: CPT | Performed by: EMERGENCY MEDICINE

## 2017-12-06 PROCEDURE — 70450 CT HEAD/BRAIN W/O DYE: CPT | Performed by: RADIOLOGY

## 2017-12-06 PROCEDURE — 85025 COMPLETE CBC W/AUTO DIFF WBC: CPT | Performed by: EMERGENCY MEDICINE

## 2017-12-06 PROCEDURE — 81003 URINALYSIS AUTO W/O SCOPE: CPT | Performed by: EMERGENCY MEDICINE

## 2017-12-06 PROCEDURE — 70450 CT HEAD/BRAIN W/O DYE: CPT

## 2017-12-06 PROCEDURE — 36415 COLL VENOUS BLD VENIPUNCTURE: CPT

## 2017-12-06 RX ORDER — MECLIZINE HCL 12.5 MG/1
25 TABLET ORAL ONCE
Status: COMPLETED | OUTPATIENT
Start: 2017-12-06 | End: 2017-12-06

## 2017-12-06 RX ORDER — MECLIZINE HYDROCHLORIDE 25 MG/1
25 TABLET ORAL EVERY 6 HOURS PRN
Qty: 22 TABLET | Refills: 0 | Status: SHIPPED | OUTPATIENT
Start: 2017-12-06 | End: 2018-01-17 | Stop reason: ALTCHOICE

## 2017-12-06 RX ADMIN — MECLIZINE HCL 25 MG: 12.5 TABLET ORAL at 20:03

## 2017-12-07 NOTE — ED PROVIDER NOTES
Subjective   Patient is a 57 y.o. female presenting with dizziness.   History provided by:  Patient  Dizziness   Quality:  Lightheadedness and imbalance  Severity:  Mild  Onset quality:  Gradual  Progression:  Worsening  Chronicity:  New  Context: bending over and head movement    Relieved by:  Nothing  Worsened by:  Movement and closing eyes  Associated symptoms: weakness    Associated symptoms: no chest pain        Review of Systems   Constitutional: Negative for fever.   HENT: Negative.    Respiratory: Negative.    Cardiovascular: Negative.  Negative for chest pain.   Gastrointestinal: Negative.  Negative for abdominal pain.   Endocrine: Negative.    Genitourinary: Negative.  Negative for dysuria.   Skin: Negative.    Neurological: Positive for dizziness, weakness and light-headedness.   Psychiatric/Behavioral: Negative.    All other systems reviewed and are negative.      Past Medical History:   Diagnosis Date   • Arthritis    • Constipation    • Frequency of urination    • GERD (gastroesophageal reflux disease)    • Tremor        Allergies   Allergen Reactions   • Amoxicillin Dermatitis   • Penicillins    • Sulfa Antibiotics        Past Surgical History:   Procedure Laterality Date   • ABDOMINAL SURGERY     • ANTERIOR VAGINAL REPAIR N/A 6/27/2017    Procedure: ANTERIOR  REPAIR;  Surgeon: Jose Ellison MD;  Location: [facility];  Service:    • BREAST SURGERY Right    • LAPAROSCOPIC CHOLECYSTECTOMY     • LAPAROSCOPIC TUBAL LIGATION     • TONSILLECTOMY     • TOTAL LAPAROSCOPIC HYSTERECTOMY N/A 6/27/2017    Procedure: TOTAL LAPAROSCOPIC HYSTERECTOMY BSO WITH DAVINCI SI ROBOT;  Surgeon: Jose Ellison MD;  Location: [facility];  Service:    • TRANSVAGINAL TAPING SUSPENSION N/A 6/27/2017    Procedure: RETROPUBIC TENSION FREE VAGINAL TAPING;  Surgeon: Jose Ellison MD;  Location: [facility] OR;  Service:        History reviewed. No pertinent family history.    Social History     Social History   • Marital status:       Spouse name: N/A   • Number of children: N/A   • Years of education: N/A     Social History Main Topics   • Smoking status: Never Smoker   • Smokeless tobacco: Never Used   • Alcohol use No   • Drug use: No   • Sexual activity: Defer     Other Topics Concern   • None     Social History Narrative           Objective   Physical Exam   Constitutional: She is oriented to person, place, and time. She appears well-developed and well-nourished. No distress.   HENT:   Head: Normocephalic and atraumatic.   Right Ear: External ear normal.   Left Ear: External ear normal.   Nose: Nose normal.   Eyes: Conjunctivae and EOM are normal. Pupils are equal, round, and reactive to light.   Neck: Normal range of motion. Neck supple. No JVD present. No tracheal deviation present.   Cardiovascular: Normal rate, regular rhythm and normal heart sounds.    No murmur heard.  Pulmonary/Chest: Effort normal and breath sounds normal. No respiratory distress. She has no wheezes.   Abdominal: Soft. Bowel sounds are normal. There is no tenderness.   Musculoskeletal: Normal range of motion. She exhibits no edema or deformity.   Neurological: She is alert and oriented to person, place, and time. No cranial nerve deficit.   Negative     Skin: Skin is warm and dry. No rash noted. She is not diaphoretic. No erythema. No pallor.   Psychiatric: She has a normal mood and affect. Her behavior is normal. Thought content normal.   Nursing note and vitals reviewed.      Procedures         ED Course  ED Course                  MDM    Final diagnoses:   Dizziness            Lam Méndez MD  12/06/17 2683

## 2017-12-10 ENCOUNTER — HOSPITAL ENCOUNTER (EMERGENCY)
Facility: HOSPITAL | Age: 57
Discharge: HOME | End: 2017-12-10
Attending: EMERGENCY MEDICINE | Admitting: EMERGENCY MEDICINE

## 2017-12-10 VITALS
HEIGHT: 65 IN | BODY MASS INDEX: 39.15 KG/M2 | DIASTOLIC BLOOD PRESSURE: 86 MMHG | SYSTOLIC BLOOD PRESSURE: 137 MMHG | OXYGEN SATURATION: 99 % | WEIGHT: 235 LBS | TEMPERATURE: 98.1 F | RESPIRATION RATE: 15 BRPM | HEART RATE: 90 BPM

## 2017-12-10 DIAGNOSIS — H83.09 LABYRINTHITIS, UNSPECIFIED LATERALITY: Primary | ICD-10-CM

## 2017-12-10 PROCEDURE — 99283 EMERGENCY DEPT VISIT LOW MDM: CPT

## 2017-12-10 PROCEDURE — 25010000002 DEXAMETHASONE PER 1 MG: Performed by: EMERGENCY MEDICINE

## 2017-12-10 PROCEDURE — 96372 THER/PROPH/DIAG INJ SC/IM: CPT

## 2017-12-10 RX ORDER — MECLIZINE HYDROCHLORIDE 25 MG/1
25 TABLET ORAL EVERY 6 HOURS PRN
Qty: 22 TABLET | Refills: 0 | Status: SHIPPED | OUTPATIENT
Start: 2017-12-10 | End: 2018-01-17 | Stop reason: ALTCHOICE

## 2017-12-10 RX ORDER — DEXAMETHASONE SODIUM PHOSPHATE 4 MG/ML
8 INJECTION, SOLUTION INTRA-ARTICULAR; INTRALESIONAL; INTRAMUSCULAR; INTRAVENOUS; SOFT TISSUE ONCE
Status: COMPLETED | OUTPATIENT
Start: 2017-12-10 | End: 2017-12-10

## 2017-12-10 RX ADMIN — DEXAMETHASONE SODIUM PHOSPHATE 8 MG: 4 INJECTION, SOLUTION INTRAMUSCULAR; INTRAVENOUS at 18:48

## 2017-12-10 NOTE — ED PROVIDER NOTES
Subjective   Patient is a 57 y.o. female presenting with dizziness.   Dizziness   Quality:  Lightheadedness and head spinning  Severity:  Mild  Onset quality:  Gradual  Timing:  Intermittent  Progression:  Waxing and waning  Chronicity:  Recurrent  Context: bending over, head movement and standing up    Relieved by:  Medication  Worsened by:  Movement and standing up  Associated symptoms: no chest pain, no nausea, no shortness of breath and no tinnitus        Review of Systems   Constitutional: Negative.  Negative for fever.   HENT: Negative.  Negative for tinnitus.    Respiratory: Negative.  Negative for shortness of breath.    Cardiovascular: Negative.  Negative for chest pain.   Gastrointestinal: Negative.  Negative for abdominal pain and nausea.   Endocrine: Negative.    Genitourinary: Negative.  Negative for dysuria.   Skin: Negative.    Neurological: Positive for dizziness.   Psychiatric/Behavioral: Negative.    All other systems reviewed and are negative.      Past Medical History:   Diagnosis Date   • Arthritis    • Constipation    • Frequency of urination    • GERD (gastroesophageal reflux disease)    • Tremor        Allergies   Allergen Reactions   • Amoxicillin Dermatitis   • Penicillins    • Sulfa Antibiotics        Past Surgical History:   Procedure Laterality Date   • ABDOMINAL SURGERY     • ANTERIOR VAGINAL REPAIR N/A 6/27/2017    Procedure: ANTERIOR  REPAIR;  Surgeon: Jose Ellison MD;  Location: [facility];  Service:    • BREAST SURGERY Right    • LAPAROSCOPIC CHOLECYSTECTOMY     • LAPAROSCOPIC TUBAL LIGATION     • TONSILLECTOMY     • TOTAL LAPAROSCOPIC HYSTERECTOMY N/A 6/27/2017    Procedure: TOTAL LAPAROSCOPIC HYSTERECTOMY BSO WITH DAVINCI SI ROBOT;  Surgeon: Jose Ellison MD;  Location: [facility];  Service:    • TRANSVAGINAL TAPING SUSPENSION N/A 6/27/2017    Procedure: RETROPUBIC TENSION FREE VAGINAL TAPING;  Surgeon: Jose Ellison MD;  Location: [facility] OR;  Service:        History  reviewed. No pertinent family history.    Social History     Social History   • Marital status:      Spouse name: N/A   • Number of children: N/A   • Years of education: N/A     Social History Main Topics   • Smoking status: Never Smoker   • Smokeless tobacco: Never Used   • Alcohol use No   • Drug use: No   • Sexual activity: Defer     Other Topics Concern   • None     Social History Narrative           Objective   Physical Exam   Constitutional: She is oriented to person, place, and time. She appears well-developed and well-nourished. No distress.   HENT:   Head: Normocephalic and atraumatic.   Right Ear: External ear normal.   Left Ear: External ear normal.   Nose: Nose normal.   Eyes: Conjunctivae and EOM are normal. Pupils are equal, round, and reactive to light.   Neck: Normal range of motion. Neck supple. No JVD present. No tracheal deviation present.   Cardiovascular: Normal rate, regular rhythm and normal heart sounds.    No murmur heard.  Pulmonary/Chest: Effort normal and breath sounds normal. No respiratory distress. She has no wheezes.   Abdominal: Soft. Bowel sounds are normal. There is no tenderness.   Musculoskeletal: Normal range of motion. She exhibits no edema or deformity.   Neurological: She is alert and oriented to person, place, and time. No cranial nerve deficit.   Skin: Skin is warm and dry. No rash noted. She is not diaphoretic. No erythema. No pallor.   Psychiatric: She has a normal mood and affect. Her behavior is normal. Thought content normal.   Nursing note and vitals reviewed.      Procedures         ED Course  ED Course                  MDM    Final diagnoses:   Labyrinthitis, unspecified laterality            Lam Méndez MD  12/10/17 5549

## 2017-12-12 ENCOUNTER — LAB (OUTPATIENT)
Dept: LAB | Facility: HOSPITAL | Age: 57
End: 2017-12-12

## 2017-12-12 ENCOUNTER — TRANSCRIBE ORDERS (OUTPATIENT)
Dept: GENERAL RADIOLOGY | Facility: HOSPITAL | Age: 57
End: 2017-12-12

## 2017-12-12 DIAGNOSIS — D72.829 LEUKOCYTOSIS, UNSPECIFIED TYPE: ICD-10-CM

## 2017-12-12 DIAGNOSIS — D72.829 LEUKOCYTOSIS, UNSPECIFIED TYPE: Primary | ICD-10-CM

## 2017-12-12 LAB
BASOPHILS # BLD AUTO: 0.04 10*3/MM3 (ref 0–0.3)
BASOPHILS NFR BLD AUTO: 0.3 % (ref 0–2)
DEPRECATED RDW RBC AUTO: 49.8 FL (ref 37–54)
EOSINOPHIL # BLD AUTO: 0.11 10*3/MM3 (ref 0–0.7)
EOSINOPHIL NFR BLD AUTO: 0.8 % (ref 0–5)
ERYTHROCYTE [DISTWIDTH] IN BLOOD BY AUTOMATED COUNT: 15.3 % (ref 11.5–14.5)
HCT VFR BLD AUTO: 38.8 % (ref 37–47)
HGB BLD-MCNC: 12.3 G/DL (ref 12–16)
IMM GRANULOCYTES # BLD: 0.06 10*3/MM3 (ref 0–0.03)
IMM GRANULOCYTES NFR BLD: 0.4 % (ref 0–0.5)
LYMPHOCYTES # BLD AUTO: 4.36 10*3/MM3 (ref 1–3)
LYMPHOCYTES NFR BLD AUTO: 30.3 % (ref 21–51)
MCH RBC QN AUTO: 28.5 PG (ref 27–33)
MCHC RBC AUTO-ENTMCNC: 31.7 G/DL (ref 33–37)
MCV RBC AUTO: 89.8 FL (ref 80–94)
MONOCYTES # BLD AUTO: 0.95 10*3/MM3 (ref 0.1–0.9)
MONOCYTES NFR BLD AUTO: 6.6 % (ref 0–10)
NEUTROPHILS # BLD AUTO: 8.88 10*3/MM3 (ref 1.4–6.5)
NEUTROPHILS NFR BLD AUTO: 61.6 % (ref 30–70)
PLATELET # BLD AUTO: 311 10*3/MM3 (ref 130–400)
PMV BLD AUTO: 9.9 FL (ref 6–10)
RBC # BLD AUTO: 4.32 10*6/MM3 (ref 4.2–5.4)
WBC NRBC COR # BLD: 14.4 10*3/MM3 (ref 4.5–12.5)

## 2017-12-12 PROCEDURE — 85025 COMPLETE CBC W/AUTO DIFF WBC: CPT

## 2017-12-12 PROCEDURE — 36415 COLL VENOUS BLD VENIPUNCTURE: CPT

## 2017-12-19 ENCOUNTER — HOSPITAL ENCOUNTER (OUTPATIENT)
Dept: GENERAL RADIOLOGY | Facility: HOSPITAL | Age: 57
Discharge: HOME | End: 2017-12-19
Admitting: NURSE PRACTITIONER

## 2017-12-19 ENCOUNTER — TRANSCRIBE ORDERS (OUTPATIENT)
Dept: GENERAL RADIOLOGY | Facility: HOSPITAL | Age: 57
End: 2017-12-19

## 2017-12-19 DIAGNOSIS — R05.9 COUGH: Primary | ICD-10-CM

## 2017-12-19 DIAGNOSIS — R05.9 COUGH: ICD-10-CM

## 2017-12-19 PROCEDURE — 71020 HC CHEST PA AND LATERAL: CPT

## 2017-12-19 PROCEDURE — 71020 XR CHEST PA AND LATERAL: CPT | Performed by: RADIOLOGY

## 2018-01-04 ENCOUNTER — TRANSCRIBE ORDERS (OUTPATIENT)
Dept: ADMINISTRATIVE | Facility: HOSPITAL | Age: 58
End: 2018-01-04

## 2018-01-04 DIAGNOSIS — M25.562 LEFT KNEE PAIN, UNSPECIFIED CHRONICITY: Primary | ICD-10-CM

## 2018-01-06 ENCOUNTER — HOSPITAL ENCOUNTER (OUTPATIENT)
Dept: MRI IMAGING | Facility: HOSPITAL | Age: 58
Discharge: HOME | End: 2018-01-06

## 2018-01-06 DIAGNOSIS — M25.562 LEFT KNEE PAIN, UNSPECIFIED CHRONICITY: ICD-10-CM

## 2018-01-15 ENCOUNTER — HOSPITAL ENCOUNTER (OUTPATIENT)
Dept: MRI IMAGING | Facility: HOSPITAL | Age: 58
Discharge: HOME | End: 2018-01-15
Admitting: NURSE PRACTITIONER

## 2018-01-15 PROCEDURE — 73721 MRI JNT OF LWR EXTRE W/O DYE: CPT

## 2018-01-15 PROCEDURE — 73721 MRI JNT OF LWR EXTRE W/O DYE: CPT | Performed by: RADIOLOGY

## 2018-01-17 ENCOUNTER — OFFICE VISIT (OUTPATIENT)
Dept: ORTHOPEDIC SURGERY | Facility: CLINIC | Age: 58
End: 2018-01-17

## 2018-01-17 VITALS — HEIGHT: 65 IN

## 2018-01-17 DIAGNOSIS — S83.242D OTHER TEAR OF MEDIAL MENISCUS OF LEFT KNEE AS CURRENT INJURY, SUBSEQUENT ENCOUNTER: Primary | ICD-10-CM

## 2018-01-17 PROCEDURE — 99213 OFFICE O/P EST LOW 20 MIN: CPT | Performed by: ORTHOPAEDIC SURGERY

## 2018-01-17 RX ORDER — AZELASTINE 1 MG/ML
SPRAY, METERED NASAL
COMMUNITY
Start: 2018-01-04 | End: 2022-05-04

## 2018-01-17 RX ORDER — LISINOPRIL 20 MG/1
20 TABLET ORAL DAILY
COMMUNITY
Start: 2018-01-08

## 2018-01-17 RX ORDER — PANTOPRAZOLE SODIUM 20 MG/1
20 TABLET, DELAYED RELEASE ORAL DAILY
COMMUNITY

## 2018-01-17 NOTE — PROGRESS NOTES
Follow up        Patient: Brianna Jones  YOB: 1960  Date of encounter: 1/17/2018      History of Present Illness:   Brianna Jones is a 57 y.o. female who returns here today for follow-up of left knee complaints.  She was initially referred to us by STEPHEN Cardenas.  At her last office visit we gave her a intra-articular steroid injection.  She states she had a reaction to and including tremors and increased blood pressure.  She states that following the reaction she did have some improvement in her knee pain but only for about a week and then symptoms returned.  She states she's been trying to wear her brace which does seem to ease the pain minimally.  She continues to complain of medial knee pain worse with weightbearing activities as well as twisting or pivoting motions.  She has increased pain with prolonged standing.  She denies any locking sensation.  She states she's been a few occasions where the knee gives way but has not caused her any falls.  Her family doctor recently ordered an MRI and she brings this with her today to review.    PMH:   Patient Active Problem List   Diagnosis   • Midline cystocele     Past Medical History:   Diagnosis Date   • Arthritis    • Constipation    • Frequency of urination    • GERD (gastroesophageal reflux disease)    • Tremor        PSH:  Past Surgical History:   Procedure Laterality Date   • ABDOMINAL SURGERY     • ANTERIOR VAGINAL REPAIR N/A 6/27/2017    Procedure: ANTERIOR  REPAIR;  Surgeon: Jose Ellison MD;  Location: [facility];  Service:    • BREAST SURGERY Right    • LAPAROSCOPIC CHOLECYSTECTOMY     • LAPAROSCOPIC TUBAL LIGATION     • TONSILLECTOMY     • TOTAL LAPAROSCOPIC HYSTERECTOMY N/A 6/27/2017    Procedure: TOTAL LAPAROSCOPIC HYSTERECTOMY BSO WITH DAVINCI SI ROBOT;  Surgeon: Jose Ellison MD;  Location: [facility];  Service:    • TRANSVAGINAL TAPING SUSPENSION N/A 6/27/2017    Procedure: RETROPUBIC TENSION FREE VAGINAL TAPING;   "Surgeon: Jose Ellison MD;  Location: [facility] OR;  Service:        Allergies:     Allergies   Allergen Reactions   • Amoxicillin Dermatitis   • Penicillins    • Sulfa Antibiotics        Medications:     Current Outpatient Prescriptions:   •  pantoprazole (PROTONIX) 20 MG EC tablet, Take 20 mg by mouth Daily., Disp: , Rfl:   •  azelastine (ASTELIN) 0.1 % nasal spray, , Disp: , Rfl:   •  lisinopril (PRINIVIL,ZESTRIL) 30 MG tablet, , Disp: , Rfl:     Social History:  Social History     Social History   • Marital status:      Spouse name: N/A   • Number of children: N/A   • Years of education: N/A     Occupational History   • Not on file.     Social History Main Topics   • Smoking status: Never Smoker   • Smokeless tobacco: Never Used   • Alcohol use No   • Drug use: No   • Sexual activity: Defer     Other Topics Concern   • Not on file     Social History Narrative       Family History:   No family history on file.    Review of Systems:   Review of Systems    Physical Exam: 57 y.o. female  General Appearance:    Alert and oriented x 3, cooperative, in no acute distress                   Vitals:    01/17/18 1259   Height: 165.1 cm (65\")                Musculoskeletal: Examination of the left knee reveals mild effusion.  She has moderate medial joint line tenderness.  She has full range of motion.  No instability with varus or valgus stressing.  Lachman and drawer negative.  Neurovascular status is intact.    Radiology:     MRI of the left knee was reviewed revealing evidence of a medial meniscus tear with a large joint effusion, as well as thinning of the articular cartilage along the medial compartment.    Assessment    ICD-10-CM ICD-9-CM   1. Other tear of medial meniscus of left knee as current injury, subsequent encounter S83.242D V58.89     836.0       Plan:   A 57-year-old female with complaints of left knee pain.  Previous intra-articular steroid injection only improved symptoms for a few weeks.  We " reviewed an MRI today which does reveal a tear within the posterior horn of the medial meniscus.  Today we discussed treatment options including surgical intervention with arthroscopy and partial medial meniscectomy of the knee.  She states she would like to proceed with surgery but she is unsure of when she would be able to do this with her work schedule.  She states she like to think this over and return back when she is ready to schedule surgery.    Written by, Bree VANN, acting as a scribe for Dr. John

## 2018-01-31 ENCOUNTER — PREP FOR SURGERY (OUTPATIENT)
Dept: OTHER | Facility: HOSPITAL | Age: 58
End: 2018-01-31

## 2018-01-31 ENCOUNTER — OFFICE VISIT (OUTPATIENT)
Dept: ORTHOPEDIC SURGERY | Facility: CLINIC | Age: 58
End: 2018-01-31

## 2018-01-31 VITALS
DIASTOLIC BLOOD PRESSURE: 74 MMHG | SYSTOLIC BLOOD PRESSURE: 114 MMHG | WEIGHT: 235 LBS | HEART RATE: 73 BPM | BODY MASS INDEX: 39.15 KG/M2 | HEIGHT: 65 IN

## 2018-01-31 DIAGNOSIS — S83.242D OTHER TEAR OF MEDIAL MENISCUS OF LEFT KNEE AS CURRENT INJURY, SUBSEQUENT ENCOUNTER: Primary | ICD-10-CM

## 2018-01-31 PROBLEM — M17.0 PRIMARY OSTEOARTHRITIS OF BOTH KNEES: Status: ACTIVE | Noted: 2018-01-31

## 2018-01-31 PROCEDURE — 99214 OFFICE O/P EST MOD 30 MIN: CPT | Performed by: ORTHOPAEDIC SURGERY

## 2018-01-31 RX ORDER — DIPHENHYDRAMINE HCL 25 MG
25 TABLET ORAL EVERY 6 HOURS PRN
COMMUNITY
End: 2018-02-20

## 2018-01-31 NOTE — PROGRESS NOTES
History and Physical    Patient: Brianna Jones  YOB: 1960  Date of encounter: 01/31/2018      History of Present Illness:   Brianna Jones is a 57 y.o.  female who was initially referred to us by STEPHEN Cardenas for left knee pain.  She has had ongoing knee pain for some time.  She complains of medial knee pain worse with weightbearing activities, as well as twisting or pivoting motions.  She also has complaints of catching sensation within her knee.  We've reviewed her previous MRI which does reveal a tear within the medial meniscus.  She states pain is getting progressively worse.  Previous conservative measures including injections, NSAIDs, and home exercises have not improved symptoms.  She  would like to further discuss possible surgical intervention.      PMH:   Patient Active Problem List   Diagnosis   • Midline cystocele   • Primary osteoarthritis of both knees     Past Medical History:   Diagnosis Date   • Arthritis    • Constipation    • Frequency of urination    • GERD (gastroesophageal reflux disease)    • Tremor          PSH:  Past Surgical History:   Procedure Laterality Date   • ABDOMINAL SURGERY     • ANTERIOR VAGINAL REPAIR N/A 6/27/2017    Procedure: ANTERIOR  REPAIR;  Surgeon: Jose Ellison MD;  Location: [facility];  Service:    • BREAST SURGERY Right    • LAPAROSCOPIC CHOLECYSTECTOMY     • LAPAROSCOPIC TUBAL LIGATION     • TONSILLECTOMY     • TOTAL LAPAROSCOPIC HYSTERECTOMY N/A 6/27/2017    Procedure: TOTAL LAPAROSCOPIC HYSTERECTOMY BSO WITH DAVINCI SI ROBOT;  Surgeon: Jose Ellison MD;  Location: [facility] OR;  Service:    • TRANSVAGINAL TAPING SUSPENSION N/A 6/27/2017    Procedure: RETROPUBIC TENSION FREE VAGINAL TAPING;  Surgeon: Jose Ellison MD;  Location: [facility] OR;  Service:        Allergies:     Allergies   Allergen Reactions   • Amoxicillin Dermatitis   • Sulfa Antibiotics      Patient doesn't not remember the reaction    • Penicillins Rash        Medications:     Current Outpatient Prescriptions:   •  azelastine (ASTELIN) 0.1 % nasal spray, , Disp: , Rfl:   •  diphenhydrAMINE (ALLERGY MEDICATION) 25 MG tablet, Take 25 mg by mouth Every 6 (Six) Hours As Needed for Itching., Disp: , Rfl:   •  lisinopril (PRINIVIL,ZESTRIL) 30 MG tablet, , Disp: , Rfl:   •  pantoprazole (PROTONIX) 20 MG EC tablet, Take 20 mg by mouth Daily., Disp: , Rfl:     Social History:     Social History     Occupational History   • Not on file.     Social History Main Topics   • Smoking status: Never Smoker   • Smokeless tobacco: Never Used   • Alcohol use No   • Drug use: No   • Sexual activity: Defer      Social History     Social History Narrative       Family History:   History reviewed. No pertinent family history.    Review of Systems:   Review of Systems   Constitutional: Negative.  Negative for activity change, appetite change, chills, diaphoresis, fatigue, fever and unexpected weight change.   HENT: Negative.  Negative for congestion, dental problem, drooling, ear discharge, ear pain, facial swelling, hearing loss, mouth sores and nosebleeds.    Eyes: Negative.  Negative for photophobia, pain, discharge, redness, itching and visual disturbance.   Respiratory: Negative.  Negative for apnea, cough, choking, chest tightness, shortness of breath, wheezing and stridor.    Cardiovascular: Positive for leg swelling. Negative for chest pain and palpitations.   Gastrointestinal: Negative.  Negative for abdominal distention, abdominal pain, anal bleeding, blood in stool, constipation, diarrhea, nausea, rectal pain and vomiting.   Endocrine: Negative.  Negative for cold intolerance, heat intolerance, polydipsia, polyphagia and polyuria.   Genitourinary: Positive for hematuria. Negative for decreased urine volume, difficulty urinating, dyspareunia, dysuria, enuresis, flank pain, frequency, genital sores, menstrual problem, pelvic pain, urgency, vaginal bleeding, vaginal discharge and  "vaginal pain.   Musculoskeletal:        Pertinent positives mentioned in HPI   Skin: Negative.  Negative for color change, pallor, rash and wound.   Allergic/Immunologic: Negative for environmental allergies, food allergies and immunocompromised state.   Neurological: Positive for tremors. Negative for dizziness, seizures, syncope, facial asymmetry, speech difficulty, weakness, light-headedness, numbness and headaches.   Hematological: Negative.  Negative for adenopathy. Does not bruise/bleed easily.   Psychiatric/Behavioral: Negative.  Negative for agitation, behavioral problems, confusion, decreased concentration, dysphoric mood, hallucinations, self-injury, sleep disturbance and suicidal ideas. The patient is not nervous/anxious and is not hyperactive.        Physical Exam:   General Appearance:    57 y.o. female  cooperative, in no acute distress.  Alert and oriented x 3,                   Vitals:    01/31/18 1258   BP: 114/74   BP Location: Left arm   Patient Position: Sitting   Cuff Size: Large Adult   Pulse: 73   Weight: 107 kg (235 lb)   Height: 165.1 cm (65\")          HEENT: Unremarkable      Neck: Supple without lymphadenopathy.      Chest: Clear to ascultation bilaterally. Normal respiratory effort.      Heart: Regular rate and rhythm. No murmurs noted.      Skin:  Warm and dry without any rash.      Musculoskeletal:  Upper Extremities: Unremarkable.   Lower Extremities: Examination of the left knee reveals mild effusion.  She has moderate medial joint line tenderness.  She has full range of motion.  No instability with varus or valgus stressing.  Lachman and drawer negative.  Neurovascular status is intact      Radiology:     MRI of the left knee was reviewed revealing evidence of a medial meniscus tear with a large joint effusion, as well as thinning of the articular cartilage along the medial compartment.    Assessment    ICD-10-CM ICD-9-CM   1. Other tear of medial meniscus of left knee as current " injury, subsequent encounter S83.242D V58.89     836.0       Plan:   A 57-year-old female with continued complaints of significant left knee pain.  She is felt conservative measures including an intra-articular injection as well as NSAIDs.  We reviewed an MRI again today which reveals a tear within the posterior horn of the medial meniscus.  Given that symptoms are worsening we have discussed  proceeding with surgical intervention including arthroscopy of the left knee with partial medial meniscectomy.  We discussed the risks, benefits, and future outcomes of surgery.  She accepts these risks and is agreeable to surgery.  We'll tentatively place her on the schedule at [facility] for February 22, 2018.    Written by, Bree VANN, acting as a scribe for Dr. John    Patient's BMI is above normal parameters. Follow-up plan includes:  educational material.    This document was signed by Johnie John MD.  01/31/20188:21 PM    Cc: STEPHEN Cardenas

## 2018-02-01 PROBLEM — S83.242A TEAR OF MEDIAL MENISCUS OF LEFT KNEE, CURRENT: Status: ACTIVE | Noted: 2018-02-01

## 2018-02-02 ENCOUNTER — TELEPHONE (OUTPATIENT)
Dept: ORTHOPEDIC SURGERY | Facility: CLINIC | Age: 58
End: 2018-02-02

## 2018-02-02 NOTE — TELEPHONE ENCOUNTER
Patient notified concerning PAT date/time 2-20-18 @ 10:45a.m. She was also notified concerning her [facility] paperwork that was ready to picked up. Patient verbalized understanding.

## 2018-02-20 ENCOUNTER — APPOINTMENT (OUTPATIENT)
Dept: PREADMISSION TESTING | Facility: HOSPITAL | Age: 58
End: 2018-02-20

## 2018-02-20 ENCOUNTER — HOSPITAL ENCOUNTER (OUTPATIENT)
Dept: GENERAL RADIOLOGY | Facility: HOSPITAL | Age: 58
Discharge: HOME | End: 2018-02-20
Admitting: NURSE PRACTITIONER

## 2018-02-20 ENCOUNTER — TRANSCRIBE ORDERS (OUTPATIENT)
Dept: ADMINISTRATIVE | Facility: HOSPITAL | Age: 58
End: 2018-02-20

## 2018-02-20 DIAGNOSIS — R42 DIZZINESS AND GIDDINESS: Primary | ICD-10-CM

## 2018-02-20 DIAGNOSIS — R42 DIZZINESS AND GIDDINESS: ICD-10-CM

## 2018-02-20 LAB
ANION GAP SERPL CALCULATED.3IONS-SCNC: 7 MMOL/L (ref 3.6–11.2)
BASOPHILS # BLD AUTO: 0.03 10*3/MM3 (ref 0–0.3)
BASOPHILS NFR BLD AUTO: 0.3 % (ref 0–2)
BUN BLD-MCNC: 15 MG/DL (ref 7–21)
BUN/CREAT SERPL: 18.5 (ref 7–25)
CALCIUM SPEC-SCNC: 9.1 MG/DL (ref 7.7–10)
CHLORIDE SERPL-SCNC: 107 MMOL/L (ref 99–112)
CO2 SERPL-SCNC: 25 MMOL/L (ref 24.3–31.9)
CREAT BLD-MCNC: 0.81 MG/DL (ref 0.43–1.29)
DEPRECATED RDW RBC AUTO: 45.6 FL (ref 37–54)
EOSINOPHIL # BLD AUTO: 0.15 10*3/MM3 (ref 0–0.7)
EOSINOPHIL NFR BLD AUTO: 1.5 % (ref 0–5)
ERYTHROCYTE [DISTWIDTH] IN BLOOD BY AUTOMATED COUNT: 14.1 % (ref 11.5–14.5)
GFR SERPL CREATININE-BSD FRML MDRD: 73 ML/MIN/1.73
GLUCOSE BLD-MCNC: 91 MG/DL (ref 70–110)
HCT VFR BLD AUTO: 38.5 % (ref 37–47)
HGB BLD-MCNC: 12.3 G/DL (ref 12–16)
IMM GRANULOCYTES # BLD: 0.03 10*3/MM3 (ref 0–0.03)
IMM GRANULOCYTES NFR BLD: 0.3 % (ref 0–0.5)
LYMPHOCYTES # BLD AUTO: 2.78 10*3/MM3 (ref 1–3)
LYMPHOCYTES NFR BLD AUTO: 27.7 % (ref 21–51)
MCH RBC QN AUTO: 28.9 PG (ref 27–33)
MCHC RBC AUTO-ENTMCNC: 31.9 G/DL (ref 33–37)
MCV RBC AUTO: 90.6 FL (ref 80–94)
MONOCYTES # BLD AUTO: 0.65 10*3/MM3 (ref 0.1–0.9)
MONOCYTES NFR BLD AUTO: 6.5 % (ref 0–10)
NEUTROPHILS # BLD AUTO: 6.4 10*3/MM3 (ref 1.4–6.5)
NEUTROPHILS NFR BLD AUTO: 63.7 % (ref 30–70)
OSMOLALITY SERPL CALC.SUM OF ELEC: 278 MOSM/KG (ref 273–305)
PLATELET # BLD AUTO: 297 10*3/MM3 (ref 130–400)
PMV BLD AUTO: 9.5 FL (ref 6–10)
POTASSIUM BLD-SCNC: 3.9 MMOL/L (ref 3.5–5.3)
RBC # BLD AUTO: 4.25 10*6/MM3 (ref 4.2–5.4)
SODIUM BLD-SCNC: 139 MMOL/L (ref 135–153)
WBC NRBC COR # BLD: 10.04 10*3/MM3 (ref 4.5–12.5)

## 2018-02-20 PROCEDURE — 80048 BASIC METABOLIC PNL TOTAL CA: CPT | Performed by: ANESTHESIOLOGY

## 2018-02-20 PROCEDURE — 36415 COLL VENOUS BLD VENIPUNCTURE: CPT

## 2018-02-20 PROCEDURE — 70220 X-RAY EXAM OF SINUSES: CPT

## 2018-02-20 PROCEDURE — 85025 COMPLETE CBC W/AUTO DIFF WBC: CPT | Performed by: ANESTHESIOLOGY

## 2018-02-20 PROCEDURE — 70220 X-RAY EXAM OF SINUSES: CPT | Performed by: RADIOLOGY

## 2018-02-20 RX ORDER — DIPHENHYDRAMINE HCL 25 MG
25 CAPSULE ORAL DAILY
COMMUNITY
End: 2022-05-04

## 2018-02-21 ENCOUNTER — TELEPHONE (OUTPATIENT)
Dept: ORTHOPEDIC SURGERY | Facility: CLINIC | Age: 58
End: 2018-02-21

## 2018-02-22 ENCOUNTER — ANESTHESIA (OUTPATIENT)
Dept: PERIOP | Facility: HOSPITAL | Age: 58
End: 2018-02-22

## 2018-02-22 ENCOUNTER — HOSPITAL ENCOUNTER (OUTPATIENT)
Facility: HOSPITAL | Age: 58
Setting detail: HOSPITAL OUTPATIENT SURGERY
Discharge: HOME | End: 2018-02-22
Attending: ORTHOPAEDIC SURGERY | Admitting: ORTHOPAEDIC SURGERY

## 2018-02-22 ENCOUNTER — ANESTHESIA EVENT (OUTPATIENT)
Dept: PERIOP | Facility: HOSPITAL | Age: 58
End: 2018-02-22

## 2018-02-22 VITALS
DIASTOLIC BLOOD PRESSURE: 82 MMHG | OXYGEN SATURATION: 99 % | WEIGHT: 235 LBS | RESPIRATION RATE: 16 BRPM | HEART RATE: 70 BPM | HEIGHT: 65 IN | BODY MASS INDEX: 39.15 KG/M2 | TEMPERATURE: 97.8 F | SYSTOLIC BLOOD PRESSURE: 121 MMHG

## 2018-02-22 PROCEDURE — 25010000002 MIDAZOLAM PER 1 MG: Performed by: NURSE ANESTHETIST, CERTIFIED REGISTERED

## 2018-02-22 PROCEDURE — 25010000002 ONDANSETRON PER 1 MG: Performed by: NURSE ANESTHETIST, CERTIFIED REGISTERED

## 2018-02-22 PROCEDURE — 25010000002 PROPOFOL 10 MG/ML EMULSION: Performed by: NURSE ANESTHETIST, CERTIFIED REGISTERED

## 2018-02-22 PROCEDURE — 29881 ARTHRS KNE SRG MNISECTMY M/L: CPT | Performed by: ORTHOPAEDIC SURGERY

## 2018-02-22 PROCEDURE — 25010000002 FENTANYL CITRATE (PF) 100 MCG/2ML SOLUTION: Performed by: NURSE ANESTHETIST, CERTIFIED REGISTERED

## 2018-02-22 PROCEDURE — 25010000002 DEXAMETHASONE PER 1 MG: Performed by: NURSE ANESTHETIST, CERTIFIED REGISTERED

## 2018-02-22 PROCEDURE — 94799 UNLISTED PULMONARY SVC/PX: CPT

## 2018-02-22 RX ORDER — OXYCODONE HYDROCHLORIDE AND ACETAMINOPHEN 5; 325 MG/1; MG/1
1 TABLET ORAL ONCE AS NEEDED
Status: DISCONTINUED | OUTPATIENT
Start: 2018-02-22 | End: 2018-02-22 | Stop reason: HOSPADM

## 2018-02-22 RX ORDER — HYDROCODONE BITARTRATE AND ACETAMINOPHEN 7.5; 325 MG/1; MG/1
1 TABLET ORAL EVERY 6 HOURS PRN
Qty: 20 TABLET | Refills: 0 | Status: SHIPPED | OUTPATIENT
Start: 2018-02-22 | End: 2022-05-04

## 2018-02-22 RX ORDER — FENTANYL CITRATE 50 UG/ML
INJECTION, SOLUTION INTRAMUSCULAR; INTRAVENOUS AS NEEDED
Status: DISCONTINUED | OUTPATIENT
Start: 2018-02-22 | End: 2018-02-22 | Stop reason: SURG

## 2018-02-22 RX ORDER — FENTANYL CITRATE 50 UG/ML
50 INJECTION, SOLUTION INTRAMUSCULAR; INTRAVENOUS
Status: DISCONTINUED | OUTPATIENT
Start: 2018-02-22 | End: 2018-02-22 | Stop reason: HOSPADM

## 2018-02-22 RX ORDER — PROPOFOL 10 MG/ML
VIAL (ML) INTRAVENOUS AS NEEDED
Status: DISCONTINUED | OUTPATIENT
Start: 2018-02-22 | End: 2018-02-22 | Stop reason: SURG

## 2018-02-22 RX ORDER — SODIUM CHLORIDE 0.9 % (FLUSH) 0.9 %
1-10 SYRINGE (ML) INJECTION AS NEEDED
Status: DISCONTINUED | OUTPATIENT
Start: 2018-02-22 | End: 2018-02-22 | Stop reason: HOSPADM

## 2018-02-22 RX ORDER — BUPIVACAINE HYDROCHLORIDE 5 MG/ML
INJECTION, SOLUTION PERINEURAL AS NEEDED
Status: DISCONTINUED | OUTPATIENT
Start: 2018-02-22 | End: 2018-02-22 | Stop reason: HOSPADM

## 2018-02-22 RX ORDER — FAMOTIDINE 10 MG/ML
INJECTION, SOLUTION INTRAVENOUS AS NEEDED
Status: DISCONTINUED | OUTPATIENT
Start: 2018-02-22 | End: 2018-02-22 | Stop reason: SURG

## 2018-02-22 RX ORDER — MAGNESIUM HYDROXIDE 1200 MG/15ML
LIQUID ORAL AS NEEDED
Status: DISCONTINUED | OUTPATIENT
Start: 2018-02-22 | End: 2018-02-22 | Stop reason: HOSPADM

## 2018-02-22 RX ORDER — SODIUM CHLORIDE, SODIUM LACTATE, POTASSIUM CHLORIDE, CALCIUM CHLORIDE 600; 310; 30; 20 MG/100ML; MG/100ML; MG/100ML; MG/100ML
125 INJECTION, SOLUTION INTRAVENOUS CONTINUOUS
Status: DISCONTINUED | OUTPATIENT
Start: 2018-02-22 | End: 2018-02-22 | Stop reason: HOSPADM

## 2018-02-22 RX ORDER — DEXAMETHASONE SODIUM PHOSPHATE 4 MG/ML
INJECTION, SOLUTION INTRA-ARTICULAR; INTRALESIONAL; INTRAMUSCULAR; INTRAVENOUS; SOFT TISSUE AS NEEDED
Status: DISCONTINUED | OUTPATIENT
Start: 2018-02-22 | End: 2018-02-22 | Stop reason: SURG

## 2018-02-22 RX ORDER — MIDAZOLAM HYDROCHLORIDE 1 MG/ML
INJECTION INTRAMUSCULAR; INTRAVENOUS AS NEEDED
Status: DISCONTINUED | OUTPATIENT
Start: 2018-02-22 | End: 2018-02-22 | Stop reason: SURG

## 2018-02-22 RX ORDER — ONDANSETRON 2 MG/ML
4 INJECTION INTRAMUSCULAR; INTRAVENOUS ONCE AS NEEDED
Status: DISCONTINUED | OUTPATIENT
Start: 2018-02-22 | End: 2018-02-22 | Stop reason: HOSPADM

## 2018-02-22 RX ORDER — ONDANSETRON 2 MG/ML
INJECTION INTRAMUSCULAR; INTRAVENOUS AS NEEDED
Status: DISCONTINUED | OUTPATIENT
Start: 2018-02-22 | End: 2018-02-22 | Stop reason: SURG

## 2018-02-22 RX ORDER — MEPERIDINE HYDROCHLORIDE 25 MG/ML
12.5 INJECTION INTRAMUSCULAR; INTRAVENOUS; SUBCUTANEOUS
Status: DISCONTINUED | OUTPATIENT
Start: 2018-02-22 | End: 2018-02-22 | Stop reason: HOSPADM

## 2018-02-22 RX ORDER — IPRATROPIUM BROMIDE AND ALBUTEROL SULFATE 2.5; .5 MG/3ML; MG/3ML
3 SOLUTION RESPIRATORY (INHALATION) ONCE AS NEEDED
Status: DISCONTINUED | OUTPATIENT
Start: 2018-02-22 | End: 2018-02-22 | Stop reason: HOSPADM

## 2018-02-22 RX ADMIN — FENTANYL CITRATE 100 MCG: 50 INJECTION INTRAMUSCULAR; INTRAVENOUS at 07:25

## 2018-02-22 RX ADMIN — FENTANYL CITRATE 50 MCG: 50 INJECTION INTRAMUSCULAR; INTRAVENOUS at 08:09

## 2018-02-22 RX ADMIN — MIDAZOLAM HYDROCHLORIDE 2 MG: 1 INJECTION, SOLUTION INTRAMUSCULAR; INTRAVENOUS at 07:25

## 2018-02-22 RX ADMIN — FENTANYL CITRATE 50 MCG: 50 INJECTION INTRAMUSCULAR; INTRAVENOUS at 07:36

## 2018-02-22 RX ADMIN — DEXAMETHASONE SODIUM PHOSPHATE 4 MG: 4 INJECTION, SOLUTION INTRAMUSCULAR; INTRAVENOUS at 07:25

## 2018-02-22 RX ADMIN — PROPOFOL 160 MG: 10 INJECTION, EMULSION INTRAVENOUS at 07:29

## 2018-02-22 RX ADMIN — ONDANSETRON 4 MG: 2 INJECTION, SOLUTION INTRAMUSCULAR; INTRAVENOUS at 07:25

## 2018-02-22 RX ADMIN — SODIUM CHLORIDE, POTASSIUM CHLORIDE, SODIUM LACTATE AND CALCIUM CHLORIDE 125 ML/HR: 600; 310; 30; 20 INJECTION, SOLUTION INTRAVENOUS at 07:21

## 2018-02-22 RX ADMIN — FAMOTIDINE 20 MG: 10 INJECTION, SOLUTION INTRAVENOUS at 07:25

## 2018-02-22 NOTE — ANESTHESIA POSTPROCEDURE EVALUATION
Patient: Brianna Jones    Procedure Summary     Date Anesthesia Start Anesthesia Stop Room / Location    02/22/18 0725 0839  [facility] OR 03 / [facility]       Procedure Diagnosis Surgeon Provider    LEFT KNEE ARTHROSCOPY WITH PARTIAL MEDIAL MENISCECTOMY,  CHONDROPLASTY (Left Knee) Other tear of medial meniscus of left knee as current injury, subsequent encounter  (Other tear of medial meniscus of left knee as current injury, subsequent encounter [H99.715X]) MD Salo Calvin MD          Anesthesia Type: general  Last vitals  BP   101/68 (02/22/18 0855)   Temp   97.7 °F (36.5 °C) (02/22/18 0840)   Pulse   62 (02/22/18 0855)   Resp   15 (02/22/18 0855)     SpO2   96 % (02/22/18 0855)     Post Anesthesia Care and Evaluation    Patient location during evaluation: PHASE II  Patient participation: complete - patient participated  Level of consciousness: awake and alert  Pain score: 1  Pain management: adequate  Airway patency: patent  Anesthetic complications: No anesthetic complications  PONV Status: controlled  Cardiovascular status: acceptable  Respiratory status: acceptable  Hydration status: acceptable

## 2018-02-22 NOTE — ANESTHESIA PROCEDURE NOTES
Airway  Urgency: elective    Date/Time: 2/22/2018 7:25 AM    General Information and Staff    Patient location during procedure: OR  Anesthesiologist: NORBERT WAGNER  CRNA: LATONIA KAY    Indications and Patient Condition    Preoxygenated: yes  MILS not maintained throughout  Mask difficulty assessment: 0 - not attempted    Final Airway Details  Final airway type: supraglottic airway      Successful airway: classic  Size 4    Number of attempts at approach: 1    Additional Comments  Atraumatic LMA placement, dentition unchanged.

## 2018-02-22 NOTE — ANESTHESIA PREPROCEDURE EVALUATION
Anesthesia Evaluation     Patient summary reviewed and Nursing notes reviewed   no history of anesthetic complications:  NPO Solid Status: > 8 hours  NPO Liquid Status: > 8 hours           Airway   Mallampati: II  TM distance: >3 FB  Neck ROM: full  no difficulty expected  Dental - normal exam         Pulmonary - normal exam   (+) asthma, sleep apnea,   (-) not a smoker  Cardiovascular - normal exam  Exercise tolerance: good (4-7 METS)    NYHA Classification: II    (+) hypertension, past MI , dysrhythmias,   (-) angina, CHF, hyperlipidemia      Neuro/Psych  (+) tremors,     (-) seizures, CVA  GI/Hepatic/Renal/Endo    (+) obesity,  GERD well controlled,   (-) diabetes, hypothyroidism    Musculoskeletal     Abdominal  - normal exam    Bowel sounds: normal.   Substance History - negative use     OB/GYN negative ob/gyn ROS         Other   (+) arthritis                     Anesthesia Plan    ASA 3     general     intravenous induction   Anesthetic plan and risks discussed with patient and child.  Use of blood products discussed with patient and child  Consented to blood products.

## 2018-02-23 ENCOUNTER — HOSPITAL ENCOUNTER (OUTPATIENT)
Dept: GENERAL RADIOLOGY | Facility: HOSPITAL | Age: 58
Discharge: HOME | End: 2018-02-23
Attending: ORTHOPAEDIC SURGERY

## 2018-02-28 ENCOUNTER — OFFICE VISIT (OUTPATIENT)
Dept: ORTHOPEDIC SURGERY | Facility: CLINIC | Age: 58
End: 2018-02-28

## 2018-02-28 VITALS — HEIGHT: 65 IN | BODY MASS INDEX: 39.15 KG/M2 | WEIGHT: 235 LBS

## 2018-02-28 DIAGNOSIS — Z98.890 S/P ARTHROSCOPY OF LEFT KNEE: Primary | ICD-10-CM

## 2018-02-28 DIAGNOSIS — S83.242D OTHER TEAR OF MEDIAL MENISCUS OF LEFT KNEE AS CURRENT INJURY, SUBSEQUENT ENCOUNTER: ICD-10-CM

## 2018-02-28 PROCEDURE — 99024 POSTOP FOLLOW-UP VISIT: CPT | Performed by: ORTHOPAEDIC SURGERY

## 2018-02-28 NOTE — PROGRESS NOTES
"Knee Scope Initial Post-op Visit    Patient: Brianna Jones  YOB: 1960        History of Present Illness: Pt is here f/u knee arthroscopy partial medial meniscectomy and chondroplasty medial compartment. Doing well without significant complaints. She states that pain has already improved some with surgery.       Physical Exam: 57 y.o. female  General Appearance:    Alert, cooperative, in no acute distress                   Vitals:    02/28/18 1008   Weight: 107 kg (235 lb)   Height: 165.1 cm (65\")      Patient is alert and oriented ×3; no acute distress.  Physical exam of the knee reveals that the incisions look good, there is no erythema, calf is soft and non-tender.  No sign or sx of DVT. Full extension and flexion to 90. Neurovascular status is intact.      Assessment  S/P knee scope. We did review intraoperative findings and arthroscopic pictures with the patient.      Plan: 1) Removed sutures            2) Start Physical Therapy            3) Follow up in 6 weeks .      Written by, Bree VANN, acting as a scribe for Dr. John            "

## 2018-03-22 ENCOUNTER — TELEPHONE (OUTPATIENT)
Dept: ORTHOPEDIC SURGERY | Facility: CLINIC | Age: 58
End: 2018-03-22

## 2018-03-22 NOTE — TELEPHONE ENCOUNTER
Patient called stated she would like to RTW 4-2-18.    SX: 2-22-18 Left Knee Partial Medial Meniscectomy and Chondroplasty to the Medial Femoral Condyle    Patient works at [employer]

## 2018-09-24 ENCOUNTER — HOSPITAL ENCOUNTER (OUTPATIENT)
Dept: MAMMOGRAPHY | Facility: HOSPITAL | Age: 58
Discharge: HOME | End: 2018-09-24
Admitting: NURSE PRACTITIONER

## 2018-09-24 DIAGNOSIS — Z12.39 SCREENING BREAST EXAMINATION: ICD-10-CM

## 2018-09-24 PROCEDURE — 77067 SCR MAMMO BI INCL CAD: CPT

## 2018-09-24 PROCEDURE — 77063 BREAST TOMOSYNTHESIS BI: CPT | Performed by: RADIOLOGY

## 2018-09-24 PROCEDURE — 77063 BREAST TOMOSYNTHESIS BI: CPT

## 2018-09-24 PROCEDURE — 77067 SCR MAMMO BI INCL CAD: CPT | Performed by: RADIOLOGY

## 2019-02-22 ENCOUNTER — TRANSCRIBE ORDERS (OUTPATIENT)
Dept: ADMINISTRATIVE | Facility: HOSPITAL | Age: 59
End: 2019-02-22

## 2019-02-22 DIAGNOSIS — R42 DIZZINESS AND GIDDINESS: Primary | ICD-10-CM

## 2019-02-27 ENCOUNTER — HOSPITAL ENCOUNTER (OUTPATIENT)
Dept: MRI IMAGING | Facility: HOSPITAL | Age: 59
Discharge: HOME | End: 2019-02-27

## 2019-02-27 ENCOUNTER — HOSPITAL ENCOUNTER (OUTPATIENT)
Dept: GENERAL RADIOLOGY | Facility: HOSPITAL | Age: 59
Discharge: HOME | End: 2019-02-27

## 2019-02-27 ENCOUNTER — HOSPITAL ENCOUNTER (OUTPATIENT)
Dept: CARDIOLOGY | Facility: HOSPITAL | Age: 59
Discharge: HOME | End: 2019-02-27
Admitting: NURSE PRACTITIONER

## 2019-02-27 ENCOUNTER — APPOINTMENT (OUTPATIENT)
Dept: MRI IMAGING | Facility: HOSPITAL | Age: 59
End: 2019-02-27

## 2019-02-27 DIAGNOSIS — R42 DIZZINESS AND GIDDINESS: ICD-10-CM

## 2019-02-27 DIAGNOSIS — M79.5 FOREIGN BODY (FB) IN SOFT TISSUE: ICD-10-CM

## 2019-02-27 PROCEDURE — 93880 EXTRACRANIAL BILAT STUDY: CPT | Performed by: RADIOLOGY

## 2019-02-27 PROCEDURE — 70551 MRI BRAIN STEM W/O DYE: CPT | Performed by: RADIOLOGY

## 2019-02-27 PROCEDURE — 70551 MRI BRAIN STEM W/O DYE: CPT

## 2019-02-27 PROCEDURE — 70030 X-RAY EYE FOR FOREIGN BODY: CPT | Performed by: RADIOLOGY

## 2019-02-27 PROCEDURE — 93880 EXTRACRANIAL BILAT STUDY: CPT

## 2019-02-27 PROCEDURE — 70030 X-RAY EYE FOR FOREIGN BODY: CPT

## 2019-09-18 ENCOUNTER — OFFICE VISIT (OUTPATIENT)
Dept: CARDIAC SURGERY | Facility: CLINIC | Age: 59
End: 2019-09-18

## 2019-09-18 VITALS
SYSTOLIC BLOOD PRESSURE: 125 MMHG | HEIGHT: 65 IN | DIASTOLIC BLOOD PRESSURE: 80 MMHG | HEART RATE: 71 BPM | OXYGEN SATURATION: 98 % | BODY MASS INDEX: 38.62 KG/M2 | WEIGHT: 231.8 LBS

## 2019-09-18 DIAGNOSIS — I65.21 CAROTID STENOSIS, ASYMPTOMATIC, RIGHT: Primary | ICD-10-CM

## 2019-09-18 PROCEDURE — 99203 OFFICE O/P NEW LOW 30 MIN: CPT | Performed by: THORACIC SURGERY (CARDIOTHORACIC VASCULAR SURGERY)

## 2019-09-18 RX ORDER — ATORVASTATIN CALCIUM 10 MG/1
TABLET, FILM COATED ORAL
COMMUNITY
Start: 2019-08-26

## 2019-09-18 RX ORDER — ASPIRIN 81 MG/1
TABLET, COATED ORAL
COMMUNITY
Start: 2019-08-24

## 2019-09-18 RX ORDER — ESTRADIOL 2 MG/1
TABLET ORAL
COMMUNITY
Start: 2019-09-14 | End: 2022-05-04

## 2019-10-02 PROBLEM — I65.21 CAROTID STENOSIS, ASYMPTOMATIC, RIGHT: Status: ACTIVE | Noted: 2019-10-02

## 2019-10-28 ENCOUNTER — APPOINTMENT (OUTPATIENT)
Dept: MAMMOGRAPHY | Facility: HOSPITAL | Age: 59
End: 2019-10-28

## 2019-10-30 ENCOUNTER — HOSPITAL ENCOUNTER (OUTPATIENT)
Dept: MAMMOGRAPHY | Facility: HOSPITAL | Age: 59
Discharge: HOME | End: 2019-10-30
Admitting: NURSE PRACTITIONER

## 2019-10-30 DIAGNOSIS — Z12.39 SCREENING BREAST EXAMINATION: ICD-10-CM

## 2019-10-30 PROCEDURE — 77067 SCR MAMMO BI INCL CAD: CPT | Performed by: RADIOLOGY

## 2019-10-30 PROCEDURE — 77067 SCR MAMMO BI INCL CAD: CPT

## 2019-10-30 PROCEDURE — 77063 BREAST TOMOSYNTHESIS BI: CPT

## 2019-10-30 PROCEDURE — 77063 BREAST TOMOSYNTHESIS BI: CPT | Performed by: RADIOLOGY

## 2019-11-14 ENCOUNTER — HOSPITAL ENCOUNTER (OUTPATIENT)
Dept: MAMMOGRAPHY | Facility: HOSPITAL | Age: 59
Discharge: HOME | End: 2019-11-14
Admitting: RADIOLOGY

## 2019-11-14 DIAGNOSIS — R92.8 ABNORMAL MAMMOGRAM: ICD-10-CM

## 2019-11-14 PROCEDURE — 77065 DX MAMMO INCL CAD UNI: CPT | Performed by: RADIOLOGY

## 2019-11-14 PROCEDURE — G0279 TOMOSYNTHESIS, MAMMO: HCPCS

## 2019-11-14 PROCEDURE — 77061 BREAST TOMOSYNTHESIS UNI: CPT | Performed by: RADIOLOGY

## 2019-11-14 PROCEDURE — 77065 DX MAMMO INCL CAD UNI: CPT

## 2020-06-02 ENCOUNTER — TRANSCRIBE ORDERS (OUTPATIENT)
Dept: ADMINISTRATIVE | Facility: HOSPITAL | Age: 60
End: 2020-06-02

## 2020-06-02 DIAGNOSIS — R25.1 INVOLUNTARY TREMBLING: Primary | ICD-10-CM

## 2020-06-02 DIAGNOSIS — R42 DIZZINESS AND GIDDINESS: ICD-10-CM

## 2020-06-09 ENCOUNTER — HOSPITAL ENCOUNTER (OUTPATIENT)
Dept: MRI IMAGING | Facility: HOSPITAL | Age: 60
Discharge: HOME | End: 2020-06-09

## 2020-06-09 ENCOUNTER — HOSPITAL ENCOUNTER (OUTPATIENT)
Dept: CARDIOLOGY | Facility: HOSPITAL | Age: 60
Discharge: HOME | End: 2020-06-09
Admitting: NURSE PRACTITIONER

## 2020-06-09 DIAGNOSIS — R25.1 INVOLUNTARY TREMBLING: ICD-10-CM

## 2020-06-09 DIAGNOSIS — R42 DIZZINESS AND GIDDINESS: ICD-10-CM

## 2020-06-09 PROCEDURE — 93880 EXTRACRANIAL BILAT STUDY: CPT | Performed by: RADIOLOGY

## 2020-06-09 PROCEDURE — 70551 MRI BRAIN STEM W/O DYE: CPT

## 2020-06-09 PROCEDURE — 93880 EXTRACRANIAL BILAT STUDY: CPT

## 2020-06-09 PROCEDURE — 70551 MRI BRAIN STEM W/O DYE: CPT | Performed by: RADIOLOGY

## 2020-11-25 ENCOUNTER — TELEPHONE (OUTPATIENT)
Dept: CARDIAC SURGERY | Facility: CLINIC | Age: 60
End: 2020-11-25

## 2020-12-22 ENCOUNTER — TELEPHONE (OUTPATIENT)
Dept: CARDIAC SURGERY | Facility: CLINIC | Age: 60
End: 2020-12-22

## 2021-02-05 ENCOUNTER — HOSPITAL ENCOUNTER (OUTPATIENT)
Dept: MAMMOGRAPHY | Facility: HOSPITAL | Age: 61
Discharge: HOME | End: 2021-02-05

## 2021-02-05 ENCOUNTER — HOSPITAL ENCOUNTER (OUTPATIENT)
Dept: BONE DENSITY | Facility: HOSPITAL | Age: 61
Discharge: HOME | End: 2021-02-05

## 2021-02-05 DIAGNOSIS — Z78.0 POSTMENOPAUSAL STATUS: ICD-10-CM

## 2021-02-05 DIAGNOSIS — Z12.31 VISIT FOR SCREENING MAMMOGRAM: ICD-10-CM

## 2021-02-05 PROCEDURE — 77067 SCR MAMMO BI INCL CAD: CPT

## 2021-02-05 PROCEDURE — 77080 DXA BONE DENSITY AXIAL: CPT | Performed by: RADIOLOGY

## 2021-02-05 PROCEDURE — 77080 DXA BONE DENSITY AXIAL: CPT

## 2021-02-05 PROCEDURE — 77063 BREAST TOMOSYNTHESIS BI: CPT | Performed by: RADIOLOGY

## 2021-02-05 PROCEDURE — 77063 BREAST TOMOSYNTHESIS BI: CPT

## 2021-02-05 PROCEDURE — 77067 SCR MAMMO BI INCL CAD: CPT | Performed by: RADIOLOGY

## 2021-03-18 ENCOUNTER — BULK ORDERING (OUTPATIENT)
Dept: CASE MANAGEMENT | Facility: OTHER | Age: 61
End: 2021-03-18

## 2021-03-18 DIAGNOSIS — Z23 IMMUNIZATION DUE: ICD-10-CM

## 2022-02-14 ENCOUNTER — TELEPHONE (OUTPATIENT)
Dept: CARDIAC SURGERY | Facility: CLINIC | Age: 62
End: 2022-02-14

## 2022-02-14 ENCOUNTER — TRANSCRIBE ORDERS (OUTPATIENT)
Dept: ADMINISTRATIVE | Facility: HOSPITAL | Age: 62
End: 2022-02-14

## 2022-02-14 ENCOUNTER — HOSPITAL ENCOUNTER (OUTPATIENT)
Dept: GENERAL RADIOLOGY | Facility: HOSPITAL | Age: 62
Discharge: HOME | End: 2022-02-14

## 2022-02-14 DIAGNOSIS — M79.642 LEFT HAND PAIN: Primary | ICD-10-CM

## 2022-02-14 DIAGNOSIS — M79.642 LEFT HAND PAIN: ICD-10-CM

## 2022-02-14 DIAGNOSIS — M67.432 GANGLION OF LEFT WRIST: ICD-10-CM

## 2022-02-14 DIAGNOSIS — R42 DIZZINESS: Primary | ICD-10-CM

## 2022-02-14 PROCEDURE — 73130 X-RAY EXAM OF HAND: CPT

## 2022-02-14 PROCEDURE — 73110 X-RAY EXAM OF WRIST: CPT | Performed by: RADIOLOGY

## 2022-02-14 PROCEDURE — 73110 X-RAY EXAM OF WRIST: CPT

## 2022-02-14 PROCEDURE — 73130 X-RAY EXAM OF HAND: CPT | Performed by: RADIOLOGY

## 2022-03-09 ENCOUNTER — HOSPITAL ENCOUNTER (OUTPATIENT)
Dept: CT IMAGING | Facility: HOSPITAL | Age: 62
Discharge: HOME | End: 2022-03-09
Admitting: NURSE PRACTITIONER

## 2022-03-09 DIAGNOSIS — R42 DIZZINESS: ICD-10-CM

## 2022-03-09 PROCEDURE — 70498 CT ANGIOGRAPHY NECK: CPT | Performed by: RADIOLOGY

## 2022-03-09 PROCEDURE — 25010000002 IOPAMIDOL 61 % SOLUTION: Performed by: NURSE PRACTITIONER

## 2022-03-09 PROCEDURE — 70498 CT ANGIOGRAPHY NECK: CPT

## 2022-03-09 PROCEDURE — 82565 ASSAY OF CREATININE: CPT

## 2022-03-09 RX ADMIN — IOPAMIDOL 80 ML: 612 INJECTION, SOLUTION INTRAVENOUS at 14:00

## 2022-04-04 LAB — CREAT BLDA-MCNC: 0.8 MG/DL (ref 0.6–1.3)

## 2022-04-25 ENCOUNTER — HOSPITAL ENCOUNTER (OUTPATIENT)
Dept: MAMMOGRAPHY | Facility: HOSPITAL | Age: 62
Discharge: HOME | End: 2022-04-25
Admitting: NURSE PRACTITIONER

## 2022-04-25 DIAGNOSIS — Z12.31 VISIT FOR SCREENING MAMMOGRAM: ICD-10-CM

## 2022-04-25 PROCEDURE — 77063 BREAST TOMOSYNTHESIS BI: CPT

## 2022-04-25 PROCEDURE — 77067 SCR MAMMO BI INCL CAD: CPT

## 2022-04-25 PROCEDURE — 77063 BREAST TOMOSYNTHESIS BI: CPT | Performed by: RADIOLOGY

## 2022-04-25 PROCEDURE — 77067 SCR MAMMO BI INCL CAD: CPT | Performed by: RADIOLOGY

## 2022-05-04 ENCOUNTER — OFFICE VISIT (OUTPATIENT)
Dept: CARDIAC SURGERY | Facility: CLINIC | Age: 62
End: 2022-05-04

## 2022-05-04 VITALS
HEART RATE: 72 BPM | TEMPERATURE: 98.2 F | BODY MASS INDEX: 39.15 KG/M2 | SYSTOLIC BLOOD PRESSURE: 138 MMHG | WEIGHT: 235 LBS | HEIGHT: 65 IN | DIASTOLIC BLOOD PRESSURE: 90 MMHG | OXYGEN SATURATION: 99 %

## 2022-05-04 DIAGNOSIS — I65.21 CAROTID STENOSIS, ASYMPTOMATIC, RIGHT: Primary | ICD-10-CM

## 2022-05-04 PROCEDURE — 99213 OFFICE O/P EST LOW 20 MIN: CPT | Performed by: NURSE PRACTITIONER

## 2022-05-04 RX ORDER — MONTELUKAST SODIUM 10 MG/1
10 TABLET ORAL NIGHTLY
COMMUNITY

## 2022-05-04 RX ORDER — CELECOXIB 200 MG/1
200 CAPSULE ORAL DAILY
COMMUNITY

## 2022-05-04 RX ORDER — LEVOCETIRIZINE DIHYDROCHLORIDE 5 MG/1
5 TABLET, FILM COATED ORAL EVERY EVENING
COMMUNITY

## 2022-05-04 NOTE — PROGRESS NOTES
"     [facility] Cardiothoracic Surgery Office Follow Up Note     Date of Encounter: 2022     Name: Brianna Jones  : 1960     Referred By: No ref. provider found  PCP: Nicole Canela APRN    Chief Complaint:    Chief Complaint   Patient presents with   • Follow-up     1 YR FU with Carotid Duplex for Carotid Stenosis-Complains of Dizziness       Subjective      History of Present Illness:    Brianna Jones is a 61 y.o. female non-smoker with history of HTN, HLD on statin therapy, and carotid disease who was referred to Dr. Ramirez in 2019.  At the time her carotid duplex showed 50% stenosis of the right ICA with plans for annual surveillance.  However she was lost to follow-up. Her PCP has sent her back to use with continued complaints of dizziness for re-evaluation of her carotids. Pt states her dizziness is intermittent and is severe enough that she has had to leave work. She is being treated for her \"ears\" with antibiotics and steroids. She has to wear earplugs at work and wonders if this makes it worse.     Review of Systems:  Review of Systems   Constitutional: Negative for chills, decreased appetite, diaphoresis, fever, malaise/fatigue, night sweats, weight gain and weight loss.   HENT: Positive for congestion. Negative for hoarse voice.    Eyes: Negative for blurred vision, double vision and visual disturbance.   Cardiovascular: Negative for chest pain, claudication, dyspnea on exertion, irregular heartbeat, leg swelling, near-syncope, orthopnea, palpitations, paroxysmal nocturnal dyspnea and syncope.   Respiratory: Negative for cough, hemoptysis, shortness of breath, sputum production and wheezing.    Hematologic/Lymphatic: Negative for adenopathy and bleeding problem. Bruises/bleeds easily.   Skin: Negative for color change, nail changes, poor wound healing and rash.   Musculoskeletal: Positive for joint pain, joint swelling (left knee) and muscle cramps. Negative for back pain and " falls.   Gastrointestinal: Negative for abdominal pain, dysphagia and heartburn.   Genitourinary: Negative for flank pain.   Neurological: Positive for dizziness. Negative for brief paralysis, disturbances in coordination, focal weakness, headaches, light-headedness, loss of balance, numbness, paresthesias, sensory change, vertigo and weakness.   Psychiatric/Behavioral: Negative for depression and suicidal ideas.   Allergic/Immunologic: Negative for persistent infections.       I have reviewed the following portions of the patient's history: allergies, current medications, past family history, past medical history, past social history, past surgical history and problem list and confirm it's accurate.    Allergies:  Allergies   Allergen Reactions   • Amoxicillin Dermatitis   • Clavulanic Acid Hives   • Sulfa Antibiotics Other (See Comments)     Patient doesn't not remember the reaction    • Penicillins Rash       Medications:      Current Outpatient Medications:   •  ASPIRIN LOW DOSE 81 MG EC tablet, , Disp: , Rfl:   •  atorvastatin (LIPITOR) 10 MG tablet, , Disp: , Rfl:   •  celecoxib (CeleBREX) 200 MG capsule, Take 200 mg by mouth Daily., Disp: , Rfl:   •  Cholecalciferol (VITAMIN D3) 5000 units tablet tablet, Take 5,000 Units by mouth Daily., Disp: , Rfl:   •  levocetirizine (XYZAL) 5 MG tablet, Take 5 mg by mouth Every Evening., Disp: , Rfl:   •  lisinopril (PRINIVIL,ZESTRIL) 20 MG tablet, Take 20 mg by mouth Daily., Disp: , Rfl:   •  montelukast (SINGULAIR) 10 MG tablet, Take 10 mg by mouth Every Night., Disp: , Rfl:   •  pantoprazole (PROTONIX) 20 MG EC tablet, Take 20 mg by mouth Daily., Disp: , Rfl:   •  HYDROcodone-acetaminophen (NORCO) 7.5-325 MG per tablet, Take 1 tablet by mouth Every 6 (Six) Hours As Needed for Moderate Pain., Disp: 20 tablet, Rfl: 0    History:   Past Medical History:   Diagnosis Date   • Arthritis    • Asthma    • Carotid stenosis, asymptomatic, right 10/2/2019   • Constipation    •  Dyslipidemia    • Esophageal stricture    • Frequency of urination    • GERD (gastroesophageal reflux disease)    • History of uterine prolapse    • Hypertension    • Tremor    • UTI (urinary tract infection)        Past Surgical History:   Procedure Laterality Date   • ABDOMINAL SURGERY     • ANTERIOR VAGINAL REPAIR N/A 6/27/2017    Procedure: ANTERIOR  REPAIR;  Surgeon: Jose Ellison MD;  Location: [facility];  Service:    • BREAST CYST EXCISION Right 5+ yrs ago   • BREAST SURGERY Right     Lumpectomy   • KNEE ARTHROSCOPY Left 2/22/2018    Procedure: LEFT KNEE ARTHROSCOPY WITH PARTIAL MEDIAL MENISCECTOMY,  CHONDROPLASTY;  Surgeon: Johnie John MD;  Location: [facility];  Service:    • LAPAROSCOPIC CHOLECYSTECTOMY     • LAPAROSCOPIC TUBAL LIGATION     • TONSILLECTOMY     • TOTAL LAPAROSCOPIC HYSTERECTOMY N/A 6/27/2017    Procedure: TOTAL LAPAROSCOPIC HYSTERECTOMY BSO WITH DAVINCI SI ROBOT;  Surgeon: Jose Ellison MD;  Location: [facility];  Service:    • TRANSVAGINAL TAPING SUSPENSION N/A 6/27/2017    Procedure: RETROPUBIC TENSION FREE VAGINAL TAPING;  Surgeon: Jose Ellison MD;  Location: [facility];  Service:        Social History     Socioeconomic History   • Marital status:    • Number of children: 1   Tobacco Use   • Smoking status: Never Smoker   • Smokeless tobacco: Never Used   Vaping Use   • Vaping Use: Never used   Substance and Sexual Activity   • Alcohol use: No   • Drug use: No   • Sexual activity: Defer     Birth control/protection: Surgical        Family History   Problem Relation Age of Onset   • Hypertension Mother    • Hypertension Father    • Heart attack Father    • Asthma Brother    • Breast cancer Neg Hx        Objective   Physical Exam:  Vitals:    05/04/22 1311 05/04/22 1312 05/04/22 1339 05/04/22 1340   BP: 150/90 130/80 148/90 138/90   BP Location: Left arm Right arm Left arm Right arm   Patient Position: Sitting Sitting     Pulse: 72      Temp: 98.2 °F (36.8 °C)   "    SpO2: 99%      Weight: 107 kg (235 lb)      Height: 165.1 cm (65\")         Body mass index is 39.11 kg/m².    Physical Exam  Vitals and nursing note reviewed.   Constitutional:       Appearance: Normal appearance. She is well-developed. She is obese.   HENT:      Head: Normocephalic and atraumatic.   Eyes:      Pupils: Pupils are equal, round, and reactive to light.   Neck:      Vascular: No carotid bruit.   Cardiovascular:      Rate and Rhythm: Normal rate and regular rhythm.      Pulses: Normal pulses.           Radial pulses are 2+ on the right side and 2+ on the left side.      Heart sounds: Normal heart sounds, S1 normal and S2 normal. No murmur heard.  Pulmonary:      Effort: Pulmonary effort is normal.      Breath sounds: Normal breath sounds.   Abdominal:      Palpations: Abdomen is soft.   Musculoskeletal:         General: No swelling.      Cervical back: Neck supple.      Right lower leg: No edema.      Left lower leg: No edema.   Skin:     General: Skin is warm and dry.      Capillary Refill: Capillary refill takes less than 2 seconds.      Findings: No bruising.   Neurological:      General: No focal deficit present.      Mental Status: She is alert and oriented to person, place, and time. Mental status is at baseline.      GCS: GCS eye subscore is 4. GCS verbal subscore is 5. GCS motor subscore is 6.      Motor: Motor function is intact.      Coordination: Coordination is intact.      Gait: Gait abnormal.      Comments: Speech impediment    Psychiatric:         Mood and Affect: Mood normal.         Speech: Speech normal.         Behavior: Behavior normal. Behavior is cooperative.         Cognition and Memory: Cognition normal.         Imaging/Labs:  CTA Carotids-3/9/2022   VASCULATURE:    Right common carotid artery:  Unremarkable.  No significant stenosis.   No dissection or occlusion.    Right internal carotid artery:  Unremarkable.  Extracranial segment is  patent with no significant stenosis.  " No dissection or occlusion.    Right external carotid artery:  Unremarkable.  No occlusion.    Right vertebral artery:  Unremarkable.  No significant stenosis.  No  dissection or occlusion.       Left common carotid artery:  Unremarkable.  No significant stenosis.   No dissection or occlusion.    Left internal carotid artery:  Unremarkable.  Extracranial segment is  patent with no significant stenosis.  No dissection or occlusion.    Left external carotid artery:  Unremarkable.  No occlusion.    Left vertebral artery:  Unremarkable.  No significant stenosis.  No  dissection or occlusion.  1.  Unremarkable CTA of the neck.  2.  Degenerative changes cervical spine with multilevel stenosis.     US Carotid-6/9/2020  No evidence of hemodynamically significant plaques or stenosis within the carotid system at this time.     US Carotid-2/24/2019  Approximately 50% stenosis of the right proximal internal carotid artery.    Assessment / Plan      Assessment / Plan:  Diagnoses and all orders for this visit:    1. Carotid stenosis, asymptomatic, right (Primary)       · Possible carotid disease who was referred to Dr. Ramirez in 2019.    · 2019 carotid duplex showed 50% stenosis of the right ICA   · 2020 carotid duplex with no hemodynamically significant stenosis  · No imaging from 2021  · CTA carotids from 3/2022 personally viewed with no evidence of carotid disease and patent subclavian's.  Imaging reviewed with Dr. Ramirez  · Pt can follow-up with us on as needed basis  · Recommend follow-up with PCP regarding dizziness. Consider referral to ENT     Follow Up:   Return on as needed basis.   Or sooner for any further concerns or worsening sign and symptoms. If unable to reach us in the office please dial 911 or go to the nearest emergency department.    Rhiannon Cowan APRMACY  [facility] Cardiothoracic Surgery    Time Spent: I spent 27 minutes caring for Brianna on this date of service. This time includes time spent by me in  the following activities: preparing for the visit, reviewing tests, obtaining and/or reviewing a separately obtained history, performing a medically appropriate examination and/or evaluation, counseling and educating the patient/family/caregiver, referring and communicating with other health care professionals, documenting information in the medical record, independently interpreting results and communicating that information with the patient/family/caregiver and care coordination.

## 2023-01-05 ENCOUNTER — APPOINTMENT (OUTPATIENT)
Dept: ULTRASOUND IMAGING | Facility: HOSPITAL | Age: 63
End: 2023-01-05
Payer: COMMERCIAL

## 2023-01-05 PROCEDURE — 81001 URINALYSIS AUTO W/SCOPE: CPT | Performed by: STUDENT IN AN ORGANIZED HEALTH CARE EDUCATION/TRAINING PROGRAM

## 2023-01-05 PROCEDURE — 85610 PROTHROMBIN TIME: CPT | Performed by: STUDENT IN AN ORGANIZED HEALTH CARE EDUCATION/TRAINING PROGRAM

## 2023-01-05 PROCEDURE — 86900 BLOOD TYPING SEROLOGIC ABO: CPT | Performed by: STUDENT IN AN ORGANIZED HEALTH CARE EDUCATION/TRAINING PROGRAM

## 2023-01-05 PROCEDURE — 99283 EMERGENCY DEPT VISIT LOW MDM: CPT

## 2023-01-05 PROCEDURE — 86901 BLOOD TYPING SEROLOGIC RH(D): CPT | Performed by: STUDENT IN AN ORGANIZED HEALTH CARE EDUCATION/TRAINING PROGRAM

## 2023-01-05 PROCEDURE — 86850 RBC ANTIBODY SCREEN: CPT | Performed by: STUDENT IN AN ORGANIZED HEALTH CARE EDUCATION/TRAINING PROGRAM

## 2023-01-05 PROCEDURE — 80050 GENERAL HEALTH PANEL: CPT | Performed by: STUDENT IN AN ORGANIZED HEALTH CARE EDUCATION/TRAINING PROGRAM

## 2023-01-05 PROCEDURE — 36415 COLL VENOUS BLD VENIPUNCTURE: CPT

## 2023-01-05 PROCEDURE — 76830 TRANSVAGINAL US NON-OB: CPT

## 2023-01-06 ENCOUNTER — HOSPITAL ENCOUNTER (EMERGENCY)
Facility: HOSPITAL | Age: 63
Discharge: HOME | End: 2023-01-06
Attending: STUDENT IN AN ORGANIZED HEALTH CARE EDUCATION/TRAINING PROGRAM | Admitting: STUDENT IN AN ORGANIZED HEALTH CARE EDUCATION/TRAINING PROGRAM
Payer: COMMERCIAL

## 2023-01-06 VITALS
BODY MASS INDEX: 36.65 KG/M2 | OXYGEN SATURATION: 100 % | HEART RATE: 80 BPM | RESPIRATION RATE: 16 BRPM | DIASTOLIC BLOOD PRESSURE: 70 MMHG | HEIGHT: 65 IN | TEMPERATURE: 98.1 F | SYSTOLIC BLOOD PRESSURE: 156 MMHG | WEIGHT: 220 LBS

## 2023-01-06 DIAGNOSIS — R31.9 HEMATURIA, UNSPECIFIED TYPE: Primary | ICD-10-CM

## 2023-01-06 LAB
ABO GROUP BLD: NORMAL
ALBUMIN SERPL-MCNC: 4.3 G/DL (ref 3.5–5.2)
ALBUMIN/GLOB SERPL: 1.5 G/DL
ALP SERPL-CCNC: 73 U/L (ref 39–117)
ALT SERPL W P-5'-P-CCNC: 11 U/L (ref 1–33)
ANION GAP SERPL CALCULATED.3IONS-SCNC: 10.4 MMOL/L (ref 5–15)
AST SERPL-CCNC: 14 U/L (ref 1–32)
BACTERIA UR QL AUTO: ABNORMAL /HPF
BASOPHILS # BLD AUTO: 0.04 10*3/MM3 (ref 0–0.2)
BASOPHILS NFR BLD AUTO: 0.4 % (ref 0–1.5)
BILIRUB SERPL-MCNC: 0.5 MG/DL (ref 0–1.2)
BILIRUB UR QL STRIP: NEGATIVE
BLD GP AB SCN SERPL QL: NEGATIVE
BUN SERPL-MCNC: 16 MG/DL (ref 8–23)
BUN/CREAT SERPL: 21.3 (ref 7–25)
CALCIUM SPEC-SCNC: 9.2 MG/DL (ref 8.6–10.5)
CHLORIDE SERPL-SCNC: 100 MMOL/L (ref 98–107)
CLARITY UR: CLEAR
CO2 SERPL-SCNC: 23.6 MMOL/L (ref 22–29)
COLOR UR: YELLOW
CREAT SERPL-MCNC: 0.75 MG/DL (ref 0.57–1)
DEPRECATED RDW RBC AUTO: 44.6 FL (ref 37–54)
EGFRCR SERPLBLD CKD-EPI 2021: 90.7 ML/MIN/1.73
EOSINOPHIL # BLD AUTO: 0.29 10*3/MM3 (ref 0–0.4)
EOSINOPHIL NFR BLD AUTO: 3.2 % (ref 0.3–6.2)
ERYTHROCYTE [DISTWIDTH] IN BLOOD BY AUTOMATED COUNT: 14 % (ref 12.3–15.4)
GLOBULIN UR ELPH-MCNC: 2.9 GM/DL
GLUCOSE SERPL-MCNC: 103 MG/DL (ref 65–99)
GLUCOSE UR STRIP-MCNC: NEGATIVE MG/DL
HCT VFR BLD AUTO: 37.9 % (ref 34–46.6)
HGB BLD-MCNC: 12.1 G/DL (ref 12–15.9)
HGB UR QL STRIP.AUTO: ABNORMAL
HYALINE CASTS UR QL AUTO: ABNORMAL /LPF
IMM GRANULOCYTES # BLD AUTO: 0.03 10*3/MM3 (ref 0–0.05)
IMM GRANULOCYTES NFR BLD AUTO: 0.3 % (ref 0–0.5)
INR PPP: 0.99 (ref 0.9–1.1)
KETONES UR QL STRIP: NEGATIVE
LEUKOCYTE ESTERASE UR QL STRIP.AUTO: NEGATIVE
LYMPHOCYTES # BLD AUTO: 2.76 10*3/MM3 (ref 0.7–3.1)
LYMPHOCYTES NFR BLD AUTO: 30.2 % (ref 19.6–45.3)
MCH RBC QN AUTO: 27.9 PG (ref 26.6–33)
MCHC RBC AUTO-ENTMCNC: 31.9 G/DL (ref 31.5–35.7)
MCV RBC AUTO: 87.5 FL (ref 79–97)
MONOCYTES # BLD AUTO: 0.64 10*3/MM3 (ref 0.1–0.9)
MONOCYTES NFR BLD AUTO: 7 % (ref 5–12)
NEUTROPHILS NFR BLD AUTO: 5.38 10*3/MM3 (ref 1.7–7)
NEUTROPHILS NFR BLD AUTO: 58.9 % (ref 42.7–76)
NITRITE UR QL STRIP: NEGATIVE
NRBC BLD AUTO-RTO: 0 /100 WBC (ref 0–0.2)
PH UR STRIP.AUTO: 5.5 [PH] (ref 5–8)
PLATELET # BLD AUTO: 263 10*3/MM3 (ref 140–450)
PMV BLD AUTO: 9.6 FL (ref 6–12)
POTASSIUM SERPL-SCNC: 4 MMOL/L (ref 3.5–5.2)
PROT SERPL-MCNC: 7.2 G/DL (ref 6–8.5)
PROT UR QL STRIP: NEGATIVE
PROTHROMBIN TIME: 13.3 SECONDS (ref 12.1–14.7)
RBC # BLD AUTO: 4.33 10*6/MM3 (ref 3.77–5.28)
RBC # UR STRIP: ABNORMAL /HPF
REF LAB TEST METHOD: ABNORMAL
RH BLD: POSITIVE
SODIUM SERPL-SCNC: 134 MMOL/L (ref 136–145)
SP GR UR STRIP: 1.01 (ref 1–1.03)
SQUAMOUS #/AREA URNS HPF: ABNORMAL /HPF
T&S EXPIRATION DATE: NORMAL
TSH SERPL DL<=0.05 MIU/L-ACNC: 1.83 UIU/ML (ref 0.27–4.2)
UROBILINOGEN UR QL STRIP: ABNORMAL
WBC # UR STRIP: ABNORMAL /HPF
WBC NRBC COR # BLD: 9.14 10*3/MM3 (ref 3.4–10.8)

## 2023-01-06 NOTE — ED TRIAGE NOTES
MEDICAL SCREENING:    Reason for Visit: vaginal bleeding    Patient initially seen in triage.  The patient was advised further evaluation and diagnostic testing will be needed, some of the treatment and testing will be initiated in the lobby in order to begin the process.  The patient will be returned to the waiting area for the time being and possibly be re-assessed by a subsequent ED provider.  The patient will be brought back to the treatment area in as timely manner as possible.

## 2023-01-06 NOTE — DISCHARGE INSTRUCTIONS
Please follow up with Dr. Castañeda given your chronic hematuria which has worsened. You may require more extensive assessment including bladder scope to help assess for cancer and other infectious pathologies. Please return if symptoms worsen.

## 2023-01-09 NOTE — ED PROVIDER NOTES
Subjective   History of Present Illness     Brianna is a 62-year-old female with past medical history for hematuria presenting to the emergency department for worsening hematuria. Patient reports over the last 2 to 3 days she has had light bleeding when she urinates. Patient reports she has been told she has hematuria years prior workup has been negative thus far. She denies any abdominal pain. No bowel changes. No recent trauma to the abdomen. No blood thinners. Patient had hysterectomy ovaries remain in place.    Review of Systems   Constitutional: Negative.  Negative for fever.   HENT: Negative.    Respiratory: Negative.    Cardiovascular: Negative.  Negative for chest pain.   Gastrointestinal: Negative.  Negative for abdominal pain.   Endocrine: Negative.    Genitourinary: Positive for hematuria. Negative for dysuria.   Skin: Negative.    Neurological: Negative.    Psychiatric/Behavioral: Negative.    All other systems reviewed and are negative.      Past Medical History:   Diagnosis Date   • Arthritis    • Asthma    • Carotid stenosis, asymptomatic, right 10/2/2019   • Constipation    • Dyslipidemia    • Esophageal stricture    • Frequency of urination    • GERD (gastroesophageal reflux disease)    • History of uterine prolapse    • Hypertension    • Tremor    • UTI (urinary tract infection)        Allergies   Allergen Reactions   • Amoxicillin Dermatitis   • Clavulanic Acid Hives   • Sulfa Antibiotics Other (See Comments)     Patient doesn't not remember the reaction    • Penicillins Rash       Past Surgical History:   Procedure Laterality Date   • ABDOMINAL SURGERY     • ANTERIOR VAGINAL REPAIR N/A 6/27/2017    Procedure: ANTERIOR  REPAIR;  Surgeon: Jose Ellison MD;  Location: [facility];  Service:    • BREAST CYST EXCISION Right 5+ yrs ago   • BREAST SURGERY Right     Lumpectomy   • KNEE ARTHROSCOPY Left 2/22/2018    Procedure: LEFT KNEE ARTHROSCOPY WITH PARTIAL MEDIAL MENISCECTOMY,  CHONDROPLASTY;   Surgeon: Johnie John MD;  Location: [facility] OR;  Service:    • LAPAROSCOPIC CHOLECYSTECTOMY     • LAPAROSCOPIC TUBAL LIGATION     • TONSILLECTOMY     • TOTAL LAPAROSCOPIC HYSTERECTOMY N/A 6/27/2017    Procedure: TOTAL LAPAROSCOPIC HYSTERECTOMY BSO WITH DAVINCI SI ROBOT;  Surgeon: Jose Ellison MD;  Location: [facility] OR;  Service:    • TRANSVAGINAL TAPING SUSPENSION N/A 6/27/2017    Procedure: RETROPUBIC TENSION FREE VAGINAL TAPING;  Surgeon: Jose Ellison MD;  Location: [facility] OR;  Service:        Family History   Problem Relation Age of Onset   • Hypertension Mother    • Hypertension Father    • Heart attack Father    • Asthma Brother    • Breast cancer Neg Hx        Social History     Socioeconomic History   • Marital status:    • Number of children: 1   Tobacco Use   • Smoking status: Never   • Smokeless tobacco: Never   Vaping Use   • Vaping Use: Never used   Substance and Sexual Activity   • Alcohol use: No   • Drug use: No   • Sexual activity: Defer     Birth control/protection: Surgical           Objective   Physical Exam  Vitals and nursing note reviewed.   HENT:      Head: Normocephalic and atraumatic.      Right Ear: Tympanic membrane normal.      Left Ear: Tympanic membrane normal.      Nose: Nose normal. No congestion.   Cardiovascular:      Rate and Rhythm: Normal rate and regular rhythm.      Pulses: Normal pulses.      Heart sounds: Normal heart sounds.   Pulmonary:      Effort: Pulmonary effort is normal. No respiratory distress.   Abdominal:      General: Abdomen is flat. Bowel sounds are normal. There is no distension.   Skin:     General: Skin is warm.      Capillary Refill: Capillary refill takes less than 2 seconds.      Coloration: Skin is not jaundiced.   Neurological:      General: No focal deficit present.      Mental Status: She is alert and oriented to person, place, and time.         Procedures           ED Course                                           Medical  Decision Making  Karen is a 62-year-old female w/ PMH for hematuria  presenting to the emergency department with hematuria. Transvaginal ultrasound obtained which is unremarkable. No large pelvic masses, ovaries not visualized. Basic labs, TSH, PT INR, urine analysis, type and screen obtained for further evaluation results remarkable for hematuria. Patient is provided a referral to Dr. Castañeda as she will require bladder biopsy and further workup given concern for bladder carcinoma given ongoing and worsening hematuria. Patient is currently stable and dose not require any emergent management at this time. Patient is discharged in stable condition and informed to keep her appointment with doctors title promptly. Patient discharge in stable condition. Of note patient informed if bleeding worsens to return to the emergency department.    Hematuria, unspecified type: complicated acute illness or injury  Amount and/or Complexity of Data Reviewed  Labs: ordered.  Radiology: ordered.          Final diagnoses:   Hematuria, unspecified type       ED Disposition  ED Disposition     ED Disposition   Discharge    Condition   Stable    Comment   --             Ulysses Castañeda MD  [address] [facility] [address]  [place] [id number] [phone]    Go in 2 days  Hematuria         Medication List      No changes were made to your prescriptions during this visit.          Nathalia Doe MD  01/08/23 2069

## 2023-01-19 ENCOUNTER — OFFICE VISIT (OUTPATIENT)
Dept: UROLOGY | Facility: CLINIC | Age: 63
End: 2023-01-19
Payer: COMMERCIAL

## 2023-01-19 VITALS
HEIGHT: 65 IN | BODY MASS INDEX: 36.65 KG/M2 | HEART RATE: 66 BPM | WEIGHT: 220 LBS | SYSTOLIC BLOOD PRESSURE: 153 MMHG | DIASTOLIC BLOOD PRESSURE: 86 MMHG

## 2023-01-19 DIAGNOSIS — R35.0 FREQUENCY OF MICTURITION: ICD-10-CM

## 2023-01-19 DIAGNOSIS — R31.0 GROSS HEMATURIA: Primary | ICD-10-CM

## 2023-01-19 LAB
BILIRUB BLD-MCNC: NEGATIVE MG/DL
CLARITY, POC: CLEAR
COLOR UR: YELLOW
EXPIRATION DATE: ABNORMAL
GLUCOSE UR STRIP-MCNC: NEGATIVE MG/DL
KETONES UR QL: NEGATIVE
LEUKOCYTE EST, POC: NEGATIVE
Lab: ABNORMAL
NITRITE UR-MCNC: NEGATIVE MG/ML
PH UR: 5 [PH] (ref 5–8)
PROT UR STRIP-MCNC: NEGATIVE MG/DL
RBC # UR STRIP: ABNORMAL /UL
SP GR UR: 1.02 (ref 1–1.03)
UROBILINOGEN UR QL: NORMAL

## 2023-01-19 PROCEDURE — 81003 URINALYSIS AUTO W/O SCOPE: CPT

## 2023-01-19 PROCEDURE — 99203 OFFICE O/P NEW LOW 30 MIN: CPT

## 2023-01-19 NOTE — PROGRESS NOTES
"Chief Complaint:    Chief Complaint   Patient presents with   • Blood in Urine       Vital Signs:   /86   Pulse 66   Ht 165.1 cm (65\")   Wt 99.8 kg (220 lb)   BMI 36.61 kg/m²   Body mass index is 36.61 kg/m².      HPI:  Brianna Jones is a 62 y.o. female who presents today for initial evaluation     History of Present Illness  Ms. Jones presents to the clinic for initial evaluation of gross hematuria.  She has been seen by Dr. Fernando and nurse practitioner in 2017 for similar symptoms.  The only abnormality discovered on CT at that time was a 1 cm left intrarenal cyst.  She also has a history of urinary tract infections.  She proceeded to go to the ER roughly 2 weeks ago for significant gross hematuria.  They did a transvaginal ultrasound at that time that revealed no abnormalities of the uterus or ovaries bilaterally.  Her UA showed 3+ hematuria.  They discharged her and scheduled an appointment to follow-up with us in office.  She denies any history of kidney stones in the past.  She reports that she has had some frequency especially when she was seen in the ER.  She states that this is improved.  UA today shows only 1+ blood with no signs of infection.  Denies any current flank pain, dysuria, urgency, gross hematuria, or difficulty urinating.  She has never had a cystoscopy in the past. Denies any history of smoking or past exposure of smoke.  Denies any family history of bladder cancer. At this time this time I am recommending repeat CT for evaluation of possible kidney stones.  I will also schedule her for cystoscopy in office with Dr. Castañeda.      Past Medical History:  Past Medical History:   Diagnosis Date   • Arthritis    • Asthma    • Carotid stenosis, asymptomatic, right 10/2/2019   • Constipation    • Dyslipidemia    • Esophageal stricture    • Frequency of urination    • GERD (gastroesophageal reflux disease)    • History of uterine prolapse    • Hypertension    • Tremor    • UTI (urinary " tract infection)        Current Meds:  Current Outpatient Medications   Medication Sig Dispense Refill   • ASPIRIN LOW DOSE 81 MG EC tablet      • atorvastatin (LIPITOR) 10 MG tablet      • celecoxib (CeleBREX) 200 MG capsule Take 200 mg by mouth Daily.     • Cholecalciferol (VITAMIN D3) 5000 units tablet tablet Take 5,000 Units by mouth Daily.     • levocetirizine (XYZAL) 5 MG tablet Take 5 mg by mouth Every Evening.     • lisinopril (PRINIVIL,ZESTRIL) 20 MG tablet Take 20 mg by mouth Daily.     • montelukast (SINGULAIR) 10 MG tablet Take 10 mg by mouth Every Night.     • pantoprazole (PROTONIX) 20 MG EC tablet Take 20 mg by mouth Daily.       No current facility-administered medications for this visit.        Allergies:   Allergies   Allergen Reactions   • Amoxicillin Dermatitis   • Clavulanic Acid Hives   • Sulfa Antibiotics Other (See Comments)     Patient doesn't not remember the reaction    • Penicillins Rash        Past Surgical History:  Past Surgical History:   Procedure Laterality Date   • ABDOMINAL SURGERY     • ANTERIOR VAGINAL REPAIR N/A 6/27/2017    Procedure: ANTERIOR  REPAIR;  Surgeon: Jose Ellison MD;  Location: [facility];  Service:    • BREAST CYST EXCISION Right 5+ yrs ago   • BREAST SURGERY Right     Lumpectomy   • KNEE ARTHROSCOPY Left 2/22/2018    Procedure: LEFT KNEE ARTHROSCOPY WITH PARTIAL MEDIAL MENISCECTOMY,  CHONDROPLASTY;  Surgeon: Johnie John MD;  Location: [facility];  Service:    • LAPAROSCOPIC CHOLECYSTECTOMY     • LAPAROSCOPIC TUBAL LIGATION     • TONSILLECTOMY     • TOTAL LAPAROSCOPIC HYSTERECTOMY N/A 6/27/2017    Procedure: TOTAL LAPAROSCOPIC HYSTERECTOMY BSO WITH DAVINCI SI ROBOT;  Surgeon: Jose Ellison MD;  Location: [facility];  Service:    • TRANSVAGINAL TAPING SUSPENSION N/A 6/27/2017    Procedure: RETROPUBIC TENSION FREE VAGINAL TAPING;  Surgeon: Jose Ellison MD;  Location: [facility] OR;  Service:        Social History:  Social History     Socioeconomic  History   • Marital status:    • Number of children: 1   Tobacco Use   • Smoking status: Never   • Smokeless tobacco: Never   Vaping Use   • Vaping Use: Never used   Substance and Sexual Activity   • Alcohol use: No   • Drug use: No   • Sexual activity: Defer     Birth control/protection: Surgical       Family History:  Family History   Problem Relation Age of Onset   • Hypertension Mother    • Hypertension Father    • Heart attack Father    • Asthma Brother    • Breast cancer Neg Hx        Review of Systems:  Review of Systems   Constitutional: Negative for fatigue, fever and unexpected weight change.   Respiratory: Negative for chest tightness and shortness of breath.    Cardiovascular: Negative for chest pain.   Gastrointestinal: Negative for abdominal pain, constipation, diarrhea, nausea and vomiting.   Genitourinary: Positive for frequency and hematuria. Negative for decreased urine volume, difficulty urinating, dyspareunia, dysuria, enuresis, flank pain, genital sores, menstrual problem, pelvic pain, urgency, vaginal bleeding, vaginal discharge and vaginal pain.   Skin: Negative for rash.   Psychiatric/Behavioral: Negative for confusion and suicidal ideas.       Physical Exam:  Physical Exam  Constitutional:       General: She is not in acute distress.     Appearance: Normal appearance.   HENT:      Head: Normocephalic and atraumatic.      Nose: Nose normal.      Mouth/Throat:      Mouth: Mucous membranes are moist.   Eyes:      Conjunctiva/sclera: Conjunctivae normal.   Cardiovascular:      Rate and Rhythm: Normal rate and regular rhythm.      Pulses: Normal pulses.      Heart sounds: Normal heart sounds.   Pulmonary:      Effort: Pulmonary effort is normal.      Breath sounds: Normal breath sounds.   Abdominal:      General: Bowel sounds are normal.      Palpations: Abdomen is soft.      Tenderness: There is no right CVA tenderness or left CVA tenderness.   Musculoskeletal:         General: Normal  range of motion.      Cervical back: Normal range of motion.   Skin:     General: Skin is warm.   Neurological:      General: No focal deficit present.      Mental Status: She is alert and oriented to person, place, and time.   Psychiatric:         Mood and Affect: Mood normal.         Behavior: Behavior normal.         Thought Content: Thought content normal.         Judgment: Judgment normal.       Recent Image (CT and/or KUB):   CT Abdomen and Pelvis: Results for orders placed during the hospital encounter of 10/24/17    CT Abdomen Pelvis With & Without Contrast    Narrative  CT ABDOMEN PELVIS WITH WITHOUT CONTRAST-    REASON FOR EXAM: Persistent microhematuria; right lower quadrant pain;  R31.9-Hematuria, unspecified; R10.31-Right lower quadrant pain.    Scans were obtained through the kidneys without contrast and during  arterial venous and delayed phases. CT shows the kidneys to be normal in  size and shape. No solid renal masses were demonstrated. There is a  small 1 cm size cyst in the midpole of the left kidney. There were no  calcifications. There was no hydronephrosis. Ureters are not dilated as  they course through the abdomen and pelvis. The urinary bladder is  smooth in contour. The liver, spleen and pancreas were unremarkable.    Impression  A source of the patient's microhematuria was not identified.      2388.11 mGy.cm  The radiation dose reduction device was utilized for each scan per the  ALARA (as low as reasonably achievable) protocol.    This report was finalized on 10/24/2017 3:10 PM by Dr. Feng Casey II, MD.     CT Stone Protocol: No results found for this or any previous visit.     KUB: No results found for this or any previous visit.       Labs:  Brief Urine Lab Results  (Last result in the past 365 days)      Color   Clarity   Blood   Leuk Est   Nitrite   Protein   CREAT   Urine HCG        01/19/23 1347 Yellow   Clear   1+   Negative   Negative   Negative               Office Visit on  01/19/2023   Component Date Value Ref Range Status   • Color 01/19/2023 Yellow  Yellow, Straw, Dark Yellow, Natalia Final   • Clarity, UA 01/19/2023 Clear  Clear Final   • Specific Gravity  01/19/2023 1.025  1.005 - 1.030 Final   • pH, Urine 01/19/2023 5.0  5.0 - 8.0 Final   • Leukocytes 01/19/2023 Negative  Negative Final   • Nitrite, UA 01/19/2023 Negative  Negative Final   • Protein, POC 01/19/2023 Negative  Negative mg/dL Final   • Glucose, UA 01/19/2023 Negative  Negative mg/dL Final   • Ketones, UA 01/19/2023 Negative  Negative Final   • Urobilinogen, UA 01/19/2023 Normal  Normal, 0.2 E.U./dL Final   • Bilirubin 01/19/2023 Negative  Negative Final   • Blood, UA 01/19/2023 1+ (A)  Negative Final   • Lot Number 01/19/2023 N   Final   • Expiration Date 01/19/2023 N   Final   Admission on 01/06/2023, Discharged on 01/06/2023   Component Date Value Ref Range Status   • Glucose 01/05/2023 103 (H)  65 - 99 mg/dL Final   • BUN 01/05/2023 16  8 - 23 mg/dL Final   • Creatinine 01/05/2023 0.75  0.57 - 1.00 mg/dL Final   • Sodium 01/05/2023 134 (L)  136 - 145 mmol/L Final   • Potassium 01/05/2023 4.0  3.5 - 5.2 mmol/L Final   • Chloride 01/05/2023 100  98 - 107 mmol/L Final   • CO2 01/05/2023 23.6  22.0 - 29.0 mmol/L Final   • Calcium 01/05/2023 9.2  8.6 - 10.5 mg/dL Final   • Total Protein 01/05/2023 7.2  6.0 - 8.5 g/dL Final   • Albumin 01/05/2023 4.3  3.5 - 5.2 g/dL Final   • ALT (SGPT) 01/05/2023 11  1 - 33 U/L Final   • AST (SGOT) 01/05/2023 14  1 - 32 U/L Final   • Alkaline Phosphatase 01/05/2023 73  39 - 117 U/L Final   • Total Bilirubin 01/05/2023 0.5  0.0 - 1.2 mg/dL Final   • Globulin 01/05/2023 2.9  gm/dL Final   • A/G Ratio 01/05/2023 1.5  g/dL Final   • BUN/Creatinine Ratio 01/05/2023 21.3  7.0 - 25.0 Final   • Anion Gap 01/05/2023 10.4  5.0 - 15.0 mmol/L Final   • eGFR 01/05/2023 90.7  >60.0 mL/min/1.73 Final    National Kidney Foundation and American Society of Nephrology (ASN) Task Force recommended  calculation based on the Chronic Kidney Disease Epidemiology Collaboration (CKD-EPI) equation refit without adjustment for race.   • Protime 01/05/2023 13.3  12.1 - 14.7 Seconds Final   • INR 01/05/2023 0.99  0.90 - 1.10 Final   • TSH 01/05/2023 1.830  0.270 - 4.200 uIU/mL Final   • Color, UA 01/05/2023 Yellow  Yellow, Straw Final   • Appearance, UA 01/05/2023 Clear  Clear Final   • pH, UA 01/05/2023 5.5  5.0 - 8.0 Final   • Specific Gravity, UA 01/05/2023 1.008  1.005 - 1.030 Final   • Glucose, UA 01/05/2023 Negative  Negative Final   • Ketones, UA 01/05/2023 Negative  Negative Final   • Bilirubin, UA 01/05/2023 Negative  Negative Final   • Blood, UA 01/05/2023 Large (3+) (A)  Negative Final   • Protein, UA 01/05/2023 Negative  Negative Final   • Leuk Esterase, UA 01/05/2023 Negative  Negative Final   • Nitrite, UA 01/05/2023 Negative  Negative Final   • Urobilinogen, UA 01/05/2023 0.2 E.U./dL  0.2 - 1.0 E.U./dL Final   • WBC 01/05/2023 9.14  3.40 - 10.80 10*3/mm3 Final   • RBC 01/05/2023 4.33  3.77 - 5.28 10*6/mm3 Final   • Hemoglobin 01/05/2023 12.1  12.0 - 15.9 g/dL Final   • Hematocrit 01/05/2023 37.9  34.0 - 46.6 % Final   • MCV 01/05/2023 87.5  79.0 - 97.0 fL Final   • MCH 01/05/2023 27.9  26.6 - 33.0 pg Final   • MCHC 01/05/2023 31.9  31.5 - 35.7 g/dL Final   • RDW 01/05/2023 14.0  12.3 - 15.4 % Final   • RDW-SD 01/05/2023 44.6  37.0 - 54.0 fl Final   • MPV 01/05/2023 9.6  6.0 - 12.0 fL Final   • Platelets 01/05/2023 263  140 - 450 10*3/mm3 Final   • Neutrophil % 01/05/2023 58.9  42.7 - 76.0 % Final   • Lymphocyte % 01/05/2023 30.2  19.6 - 45.3 % Final   • Monocyte % 01/05/2023 7.0  5.0 - 12.0 % Final   • Eosinophil % 01/05/2023 3.2  0.3 - 6.2 % Final   • Basophil % 01/05/2023 0.4  0.0 - 1.5 % Final   • Immature Grans % 01/05/2023 0.3  0.0 - 0.5 % Final   • Neutrophils, Absolute 01/05/2023 5.38  1.70 - 7.00 10*3/mm3 Final   • Lymphocytes, Absolute 01/05/2023 2.76  0.70 - 3.10 10*3/mm3 Final   • Monocytes,  Absolute 01/05/2023 0.64  0.10 - 0.90 10*3/mm3 Final   • Eosinophils, Absolute 01/05/2023 0.29  0.00 - 0.40 10*3/mm3 Final   • Basophils, Absolute 01/05/2023 0.04  0.00 - 0.20 10*3/mm3 Final   • Immature Grans, Absolute 01/05/2023 0.03  0.00 - 0.05 10*3/mm3 Final   • nRBC 01/05/2023 0.0  0.0 - 0.2 /100 WBC Final   • ABO Type 01/05/2023 A   Final   • RH type 01/05/2023 Positive   Final   • Antibody Screen 01/05/2023 Negative   Final   • T&S Expiration Date 01/05/2023 1/8/2023 11:59:59 PM   Final   • RBC, UA 01/05/2023 3-5 (A)  None Seen, 0-2 /HPF Final   • WBC, UA 01/05/2023 0-2  None Seen, 0-2 /HPF Final    Urine culture not indicated.   • Bacteria, UA 01/05/2023 None Seen  None Seen /HPF Final   • Squamous Epithelial Cells, UA 01/05/2023 0-2  None Seen, 0-2 /HPF Final   • Hyaline Casts, UA 01/05/2023 None Seen  None Seen /LPF Final   • Methodology 01/05/2023 Automated Microscopy   Final        Procedure: None  Procedures     I have reviewed and agree with the above PMH, PSH, FMH, social history, medications, allergies, and labs.      Assessment/Plan:   Problem List Items Addressed This Visit        Genitourinary and Reproductive     Gross hematuria - Primary    Relevant Orders    CT Abdomen Pelvis Stone Protocol   Other Visit Diagnoses     Frequency of micturition        Relevant Orders    POC Urinalysis Dipstick, Automated (Completed)          Health Maintenance:   Health Maintenance Due   Topic Date Due   • COLORECTAL CANCER SCREENING  Never done   • COVID-19 Vaccine (1) Never done   • TDAP/TD VACCINES (1 - Tdap) Never done   • ZOSTER VACCINE (1 of 2) Never done   • HEPATITIS C SCREENING  Never done   • ANNUAL PHYSICAL  Never done   • PAP SMEAR  Never done   • INFLUENZA VACCINE  Never done        Smoking Counseling: Patient has never smoked or use smokeless tobacco.    Urine Incontinence: Patient reports that she is not currently experiencing any symptoms of urinary incontinence.    Patient was given instructions  and counseling regarding her condition or for health maintenance advice. Please see specific information pulled into the AVS if appropriate.    Patient Education:   Gross hematuria -discussed with the patient causes of gross hematuria which can include but are not limited to urinary tract infections, urethral strictures, bladder cancer, nephrolithiasis, kidney cancer, chronic kidney disease, or other urological abnormalities.  Patient has never been worked up appropriately for lower urinary tract investigation.  At this time I am recommending a CT for upper tract investigation and any evidence of kidney stones.  I will schedule her for a cystoscopy with Dr. Castañeda in office for lower tract investigation.  I discussed that if any abnormalities are noted on cystoscopy we will schedule her in the OR for removal of tumor.  Advised patient to increase water intake to 2 to 3 L/day.  Advised her to take a daily probiotic over-the-counter to help with growth control normal urogenital beka.  Patient verbalized understanding and agrees to plan of care.    Visit Diagnoses:    ICD-10-CM ICD-9-CM   1. Gross hematuria  R31.0 599.71   2. Frequency of micturition  R35.0 788.41       Meds Ordered During Visit:  No orders of the defined types were placed in this encounter.      Follow Up Appointment: Cystoscopy with Dr. Castañeda  No follow-ups on file.      This document has been electronically signed by Jorge Pal PA-C   January 19, 2023 14:14 EST    Part of this note may be an electronic transcription/translation of spoken language to printed text using the Dragon Dictation System.

## 2023-03-22 ENCOUNTER — HOSPITAL ENCOUNTER (OUTPATIENT)
Dept: CT IMAGING | Facility: HOSPITAL | Age: 63
Discharge: HOME | End: 2023-03-22
Payer: COMMERCIAL

## 2023-03-22 DIAGNOSIS — R31.0 GROSS HEMATURIA: ICD-10-CM

## 2023-03-22 PROCEDURE — 74176 CT ABD & PELVIS W/O CONTRAST: CPT

## 2023-03-22 PROCEDURE — 74176 CT ABD & PELVIS W/O CONTRAST: CPT | Performed by: RADIOLOGY

## 2023-03-30 ENCOUNTER — PROCEDURE VISIT (OUTPATIENT)
Dept: UROLOGY | Facility: CLINIC | Age: 63
End: 2023-03-30
Payer: COMMERCIAL

## 2023-03-30 VITALS
HEIGHT: 65 IN | WEIGHT: 220 LBS | SYSTOLIC BLOOD PRESSURE: 132 MMHG | DIASTOLIC BLOOD PRESSURE: 74 MMHG | BODY MASS INDEX: 36.65 KG/M2

## 2023-03-30 DIAGNOSIS — R31.0 GROSS HEMATURIA: Primary | ICD-10-CM

## 2023-03-30 PROCEDURE — 52000 CYSTOURETHROSCOPY: CPT | Performed by: UROLOGY

## 2023-03-30 RX ORDER — GENTAMICIN SULFATE 40 MG/ML
80 INJECTION, SOLUTION INTRAMUSCULAR; INTRAVENOUS ONCE
Status: COMPLETED | OUTPATIENT
Start: 2023-03-30 | End: 2023-03-30

## 2023-03-30 RX ADMIN — GENTAMICIN SULFATE 80 MG: 40 INJECTION, SOLUTION INTRAMUSCULAR; INTRAVENOUS at 15:02

## 2023-03-30 NOTE — PROGRESS NOTES
Chief Complaint:      Chief Complaint   Patient presents with   • Cystoscopy        HPI:   62 y.o. female here for cystoscopy secondary to vaginal spotting.    Past Medical History:     Past Medical History:   Diagnosis Date   • Arthritis    • Asthma    • Carotid stenosis, asymptomatic, right 10/2/2019   • Constipation    • Dyslipidemia    • Esophageal stricture    • Frequency of urination    • GERD (gastroesophageal reflux disease)    • History of uterine prolapse    • Hypertension    • Tremor    • UTI (urinary tract infection)        Current Meds:     Current Outpatient Medications   Medication Sig Dispense Refill   • ASPIRIN LOW DOSE 81 MG EC tablet      • atorvastatin (LIPITOR) 10 MG tablet      • celecoxib (CeleBREX) 200 MG capsule Take 200 mg by mouth Daily.     • Cholecalciferol (VITAMIN D3) 5000 units tablet tablet Take 5,000 Units by mouth Daily.     • levocetirizine (XYZAL) 5 MG tablet Take 5 mg by mouth Every Evening.     • lisinopril (PRINIVIL,ZESTRIL) 20 MG tablet Take 20 mg by mouth Daily.     • montelukast (SINGULAIR) 10 MG tablet Take 10 mg by mouth Every Night.     • pantoprazole (PROTONIX) 20 MG EC tablet Take 20 mg by mouth Daily.       No current facility-administered medications for this visit.        Allergies:      Allergies   Allergen Reactions   • Amoxicillin Dermatitis   • Clavulanic Acid Hives   • Sulfa Antibiotics Other (See Comments)     Patient doesn't not remember the reaction    • Penicillins Rash        Past Surgical History:     Past Surgical History:   Procedure Laterality Date   • ABDOMINAL SURGERY     • ANTERIOR VAGINAL REPAIR N/A 6/27/2017    Procedure: ANTERIOR  REPAIR;  Surgeon: Jose Ellison MD;  Location: [facility];  Service:    • BREAST CYST EXCISION Right 5+ yrs ago   • BREAST SURGERY Right     Lumpectomy   • KNEE ARTHROSCOPY Left 2/22/2018    Procedure: LEFT KNEE ARTHROSCOPY WITH PARTIAL MEDIAL MENISCECTOMY,  CHONDROPLASTY;  Surgeon: Johnie John MD;   Location: [facility] OR;  Service:    • LAPAROSCOPIC CHOLECYSTECTOMY     • LAPAROSCOPIC TUBAL LIGATION     • TONSILLECTOMY     • TOTAL LAPAROSCOPIC HYSTERECTOMY N/A 6/27/2017    Procedure: TOTAL LAPAROSCOPIC HYSTERECTOMY BSO WITH DAVINCI SI ROBOT;  Surgeon: Jose Ellison MD;  Location: [facility] OR;  Service:    • TRANSVAGINAL TAPING SUSPENSION N/A 6/27/2017    Procedure: RETROPUBIC TENSION FREE VAGINAL TAPING;  Surgeon: Jose Ellison MD;  Location: [facility] OR;  Service:        Social History:     Social History     Socioeconomic History   • Marital status:    • Number of children: 1   Tobacco Use   • Smoking status: Never   • Smokeless tobacco: Never   Vaping Use   • Vaping Use: Never used   Substance and Sexual Activity   • Alcohol use: No   • Drug use: No   • Sexual activity: Defer     Birth control/protection: Surgical       Family History:     Family History   Problem Relation Age of Onset   • Hypertension Mother    • Hypertension Father    • Heart attack Father    • Asthma Brother    • Breast cancer Neg Hx        Review of Systems:     Review of Systems   Constitutional: Negative.  Negative for activity change, appetite change, chills, diaphoresis, fatigue and unexpected weight change.   HENT: Negative for congestion, dental problem, drooling, ear discharge, ear pain, facial swelling, hearing loss, mouth sores, nosebleeds, postnasal drip, rhinorrhea, sinus pressure, sneezing, sore throat, tinnitus, trouble swallowing and voice change.    Eyes: Negative.  Negative for photophobia, pain, discharge, redness, itching and visual disturbance.   Respiratory: Negative.  Negative for apnea, cough, choking, chest tightness, shortness of breath, wheezing and stridor.    Cardiovascular: Negative.  Negative for chest pain, palpitations and leg swelling.   Gastrointestinal: Negative.  Negative for abdominal distention, abdominal pain, anal bleeding, blood in stool, constipation, diarrhea, nausea, rectal pain and  vomiting.   Endocrine: Negative.  Negative for cold intolerance, heat intolerance, polydipsia, polyphagia and polyuria.   Musculoskeletal: Negative.  Negative for arthralgias, back pain, gait problem, joint swelling, myalgias, neck pain and neck stiffness.   Skin: Negative.  Negative for color change, pallor, rash and wound.   Allergic/Immunologic: Negative.  Negative for environmental allergies, food allergies and immunocompromised state.   Neurological: Negative.  Negative for dizziness, tremors, seizures, syncope, facial asymmetry, speech difficulty, weakness, light-headedness, numbness and headaches.   Hematological: Negative.  Negative for adenopathy. Does not bruise/bleed easily.   Psychiatric/Behavioral: Negative for agitation, behavioral problems, confusion, decreased concentration, dysphoric mood, hallucinations, self-injury, sleep disturbance and suicidal ideas. The patient is not nervous/anxious and is not hyperactive.    All other systems reviewed and are negative.      Physical Exam:     Physical Exam  Constitutional:       Appearance: She is well-developed.   HENT:      Head: Normocephalic and atraumatic.      Right Ear: External ear normal.      Left Ear: External ear normal.   Eyes:      Conjunctiva/sclera: Conjunctivae normal.      Pupils: Pupils are equal, round, and reactive to light.   Cardiovascular:      Rate and Rhythm: Normal rate and regular rhythm.      Heart sounds: Normal heart sounds.   Pulmonary:      Effort: Pulmonary effort is normal.      Breath sounds: Normal breath sounds.   Abdominal:      General: Bowel sounds are normal. There is no distension.      Palpations: Abdomen is soft. There is no mass.      Tenderness: There is no abdominal tenderness. There is no guarding or rebound.   Genitourinary:     General: Normal vulva.      Vagina: No vaginal discharge.      Comments: Morbidly obese, giant enterocele  Musculoskeletal:         General: Normal range of motion.   Skin:      General: Skin is warm and dry.   Neurological:      Mental Status: She is alert.      Deep Tendon Reflexes: Reflexes are normal and symmetric.   Psychiatric:         Behavior: Behavior normal.         Thought Content: Thought content normal.         Judgment: Judgment normal.         I have reviewed the following portions of the patient's history: Allergies, current medications, past family history, past medical history, past social history, past surgical history, problem list, and ROS and confirm it is accurate.    Recent Image (CT and/or KUB):      CT Abdomen and Pelvis: Results for orders placed during the hospital encounter of 10/24/17    CT Abdomen Pelvis With & Without Contrast    Narrative  CT ABDOMEN PELVIS WITH WITHOUT CONTRAST-    REASON FOR EXAM: Persistent microhematuria; right lower quadrant pain;  R31.9-Hematuria, unspecified; R10.31-Right lower quadrant pain.    Scans were obtained through the kidneys without contrast and during  arterial venous and delayed phases. CT shows the kidneys to be normal in  size and shape. No solid renal masses were demonstrated. There is a  small 1 cm size cyst in the midpole of the left kidney. There were no  calcifications. There was no hydronephrosis. Ureters are not dilated as  they course through the abdomen and pelvis. The urinary bladder is  smooth in contour. The liver, spleen and pancreas were unremarkable.    Impression  A source of the patient's microhematuria was not identified.      2388.11 mGy.cm  The radiation dose reduction device was utilized for each scan per the  ALARA (as low as reasonably achievable) protocol.    This report was finalized on 10/24/2017 3:10 PM by Dr. Feng Casey II, MD.       CT Stone Protocol: Results for orders placed during the hospital encounter of 03/22/23    CT Abdomen Pelvis Stone Protocol    Narrative  EXAM:  CT Abdomen and Pelvis Without Intravenous Contrast    EXAM DATE:  3/22/2023 1:14 PM    CLINICAL HISTORY:  gross  hematuria; R31.0-Gross hematuria    TECHNIQUE:  Axial computed tomography images of the abdomen and pelvis without  intravenous contrast.  Sagittal and coronal reformatted images were  created and reviewed.  This CT exam was performed using one or more of  the following dose reduction techniques:  automated exposure control,  adjustment of the mA and/or kV according to patient size, and/or use of  iterative reconstruction technique.    COMPARISON:  10/24/2017    FINDINGS:  Lung bases:  Unremarkable.  No mass.  No consolidation.    ABDOMEN:  Liver:  Unremarkable.  Gallbladder and bile ducts:  Cholecystectomy.  No ductal dilation.  Pancreas:  Unremarkable.  No ductal dilation.  Spleen:  Unremarkable.  No splenomegaly.  Adrenals:  Lipid rich right adrenal adenoma is stable.  Kidneys and ureters:  There are multiple left greater than right  parapelvic renal cysts without convincing evidence of hydronephrosis.  Dilatation of the calyceal systems of the lower Pole regions felt less  likely.  Ureters are nondilated with no ureteral stones identified.  Stomach and bowel:  Unremarkable.  No obstruction.  No mucosal  thickening.    PELVIS:  Appendix:  No findings to suggest acute appendicitis.  Bladder:  Unremarkable.  No stones.  Reproductive:  Hysterectomy.    ABDOMEN and PELVIS:  Intraperitoneal space:  Unremarkable.  No free air.  No significant  fluid collection.  Bones/joints:  Stable degenerative changes lumbar spine with  multilevel stenosis.  No acute fracture.  No dislocation.  Soft tissues:  Unremarkable.  Vasculature:  Unremarkable.  No abdominal aortic aneurysm.  Lymph nodes:  Unremarkable.  No enlarged lymph nodes.  Other findings:  Pelvic floor prolapse is noted.    Impression  1.  No renal or ureteral stones. No ureteral dilatation.  2.  Probable parapelvic renal cysts of the left greater than right mid  and lower Pole regions. Calyceal dilatation felt less likely although  contrast-enhanced study may be  helpful with delayed phase imaging.  3.  Hysterectomy and cholecystectomy. Pelvic floor prolapse noted.  4.  Other incidental and nonacute findings detailed above.    This report was finalized on 3/22/2023 1:43 PM by Dr. Sampson Falk MD.       KUB: No results found for this or any previous visit.       Labs (past 3 months):      Office Visit on 01/19/2023   Component Date Value Ref Range Status   • Color 01/19/2023 Yellow  Yellow, Straw, Dark Yellow, Natalia Final   • Clarity, UA 01/19/2023 Clear  Clear Final   • Specific Gravity  01/19/2023 1.025  1.005 - 1.030 Final   • pH, Urine 01/19/2023 5.0  5.0 - 8.0 Final   • Leukocytes 01/19/2023 Negative  Negative Final   • Nitrite, UA 01/19/2023 Negative  Negative Final   • Protein, POC 01/19/2023 Negative  Negative mg/dL Final   • Glucose, UA 01/19/2023 Negative  Negative mg/dL Final   • Ketones, UA 01/19/2023 Negative  Negative Final   • Urobilinogen, UA 01/19/2023 Normal  Normal, 0.2 E.U./dL Final   • Bilirubin 01/19/2023 Negative  Negative Final   • Blood, UA 01/19/2023 1+ (A)  Negative Final   • Lot Number 01/19/2023 N   Final   • Expiration Date 01/19/2023 N   Final   Admission on 01/06/2023, Discharged on 01/06/2023   Component Date Value Ref Range Status   • Glucose 01/05/2023 103 (H)  65 - 99 mg/dL Final   • BUN 01/05/2023 16  8 - 23 mg/dL Final   • Creatinine 01/05/2023 0.75  0.57 - 1.00 mg/dL Final   • Sodium 01/05/2023 134 (L)  136 - 145 mmol/L Final   • Potassium 01/05/2023 4.0  3.5 - 5.2 mmol/L Final   • Chloride 01/05/2023 100  98 - 107 mmol/L Final   • CO2 01/05/2023 23.6  22.0 - 29.0 mmol/L Final   • Calcium 01/05/2023 9.2  8.6 - 10.5 mg/dL Final   • Total Protein 01/05/2023 7.2  6.0 - 8.5 g/dL Final   • Albumin 01/05/2023 4.3  3.5 - 5.2 g/dL Final   • ALT (SGPT) 01/05/2023 11  1 - 33 U/L Final   • AST (SGOT) 01/05/2023 14  1 - 32 U/L Final   • Alkaline Phosphatase 01/05/2023 73  39 - 117 U/L Final   • Total Bilirubin 01/05/2023 0.5  0.0 - 1.2 mg/dL Final    • Globulin 01/05/2023 2.9  gm/dL Final   • A/G Ratio 01/05/2023 1.5  g/dL Final   • BUN/Creatinine Ratio 01/05/2023 21.3  7.0 - 25.0 Final   • Anion Gap 01/05/2023 10.4  5.0 - 15.0 mmol/L Final   • eGFR 01/05/2023 90.7  >60.0 mL/min/1.73 Final    National Kidney Foundation and American Society of Nephrology (ASN) Task Force recommended calculation based on the Chronic Kidney Disease Epidemiology Collaboration (CKD-EPI) equation refit without adjustment for race.   • Protime 01/05/2023 13.3  12.1 - 14.7 Seconds Final   • INR 01/05/2023 0.99  0.90 - 1.10 Final   • TSH 01/05/2023 1.830  0.270 - 4.200 uIU/mL Final   • Color, UA 01/05/2023 Yellow  Yellow, Straw Final   • Appearance, UA 01/05/2023 Clear  Clear Final   • pH, UA 01/05/2023 5.5  5.0 - 8.0 Final   • Specific Gravity, UA 01/05/2023 1.008  1.005 - 1.030 Final   • Glucose, UA 01/05/2023 Negative  Negative Final   • Ketones, UA 01/05/2023 Negative  Negative Final   • Bilirubin, UA 01/05/2023 Negative  Negative Final   • Blood, UA 01/05/2023 Large (3+) (A)  Negative Final   • Protein, UA 01/05/2023 Negative  Negative Final   • Leuk Esterase, UA 01/05/2023 Negative  Negative Final   • Nitrite, UA 01/05/2023 Negative  Negative Final   • Urobilinogen, UA 01/05/2023 0.2 E.U./dL  0.2 - 1.0 E.U./dL Final   • WBC 01/05/2023 9.14  3.40 - 10.80 10*3/mm3 Final   • RBC 01/05/2023 4.33  3.77 - 5.28 10*6/mm3 Final   • Hemoglobin 01/05/2023 12.1  12.0 - 15.9 g/dL Final   • Hematocrit 01/05/2023 37.9  34.0 - 46.6 % Final   • MCV 01/05/2023 87.5  79.0 - 97.0 fL Final   • MCH 01/05/2023 27.9  26.6 - 33.0 pg Final   • MCHC 01/05/2023 31.9  31.5 - 35.7 g/dL Final   • RDW 01/05/2023 14.0  12.3 - 15.4 % Final   • RDW-SD 01/05/2023 44.6  37.0 - 54.0 fl Final   • MPV 01/05/2023 9.6  6.0 - 12.0 fL Final   • Platelets 01/05/2023 263  140 - 450 10*3/mm3 Final   • Neutrophil % 01/05/2023 58.9  42.7 - 76.0 % Final   • Lymphocyte % 01/05/2023 30.2  19.6 - 45.3 % Final   • Monocyte %  01/05/2023 7.0  5.0 - 12.0 % Final   • Eosinophil % 01/05/2023 3.2  0.3 - 6.2 % Final   • Basophil % 01/05/2023 0.4  0.0 - 1.5 % Final   • Immature Grans % 01/05/2023 0.3  0.0 - 0.5 % Final   • Neutrophils, Absolute 01/05/2023 5.38  1.70 - 7.00 10*3/mm3 Final   • Lymphocytes, Absolute 01/05/2023 2.76  0.70 - 3.10 10*3/mm3 Final   • Monocytes, Absolute 01/05/2023 0.64  0.10 - 0.90 10*3/mm3 Final   • Eosinophils, Absolute 01/05/2023 0.29  0.00 - 0.40 10*3/mm3 Final   • Basophils, Absolute 01/05/2023 0.04  0.00 - 0.20 10*3/mm3 Final   • Immature Grans, Absolute 01/05/2023 0.03  0.00 - 0.05 10*3/mm3 Final   • nRBC 01/05/2023 0.0  0.0 - 0.2 /100 WBC Final   • ABO Type 01/05/2023 A   Final   • RH type 01/05/2023 Positive   Final   • Antibody Screen 01/05/2023 Negative   Final   • T&S Expiration Date 01/05/2023 1/8/2023 11:59:59 PM   Final   • RBC, UA 01/05/2023 3-5 (A)  None Seen, 0-2 /HPF Final   • WBC, UA 01/05/2023 0-2  None Seen, 0-2 /HPF Final    Urine culture not indicated.   • Bacteria, UA 01/05/2023 None Seen  None Seen /HPF Final   • Squamous Epithelial Cells, UA 01/05/2023 0-2  None Seen, 0-2 /HPF Final   • Hyaline Casts, UA 01/05/2023 None Seen  None Seen /LPF Final   • Methodology 01/05/2023 Automated Microscopy   Final        Procedure:   Cystoscopy:  Patient presents today for cystourethroscopy.  I went ahead and obtained an informed consent including the risks of anesthesia, bleeding, infection, etc.  After prepping and draping in a sterile fashion in the low dorsal lithotomy position, the urethra was gently anesthetized with 10 mL of 2% viscous Xylocaine jelly.  After an appropriate period of topical anesthesia, I used the Olympus digital 14 [place] flexible cystoscope to examine the anterior urethra which was completely normal.  The ureteral orifices were visualized and normal in position and configuration. There were no stones, tumors, or foreign bodies.  The blue light was enabled and was negative  allowing us to see small mucosal lesions. The patient was given 80 mg of gentamicin in an intramuscular fashion as prophylaxis for the cystoscopy and released from the clinic.  Assessment/Plan:   Hematuria-patient was diagnosed with hematuria.  We discussed the significance of microscopic hematuria versus gross hematuria.  We discussed the presence or absence of the type of clotting identified including vermiform clots consistent with ureteral bleeding versus just pink-tinged urine versus tino clots.  We discussed the presence of urokinase in the urine which causes the clots to dissolve with time.  We discussed the fact that it takes only a very small amount of blood in the urine to make the urine very red appearing and therefore give one the impression that there is much more blood loss that is really present.  I discussed the use of both an upper and lower tract investigation.  I discussed the fact that an upper tract investigation includes a normal renal ultrasound with a significant risk of missing more subtle lesions.  Progressing to a CT scan without contrast and finally the CT scan with contrast being the gold standard to diagnose the small neoplasms.  We discussed the lower tract investigation consisting of a cystoscopy in many of the cases where the upper tract study is negative.  Also discussed the fact that if there is a contraindication to the use of contrast we would do a noncontrasted study and this also has a chance of missing small lesions.  The specific instance would be cases of diabetes and chronic renal insufficiency.  Discussed the fact that there is about a 96% chance of a negative workup with episodes of microscopic hematuria and with much greater in the face of gross hematuria.  We discussed the fact that this is a non-cumulative test.  In other words, if there is hematuria next year I would recommend continuing to work up the condition because of the fact that neoplasms may be small at the  first workup and easily are missed.  I discussed the differential diagnosis of hematuria including trauma, neoplasia, infection, etc.  We discussed the fact that if there is any history of chronic kidney disease or risk factors such as diabetes for contrast a noncontrasted study will be utilized.  We will initiate an investigation.  Her upper tract study was remarkable only for some simple cysts and the aforementioned giant enterocele.  Because of the vaginal spotting and the negative cystoscopy I recommended gynecologic investigation strongly.  I offered to give her a referral she is here to think about it and get back with me              This document has been electronically signed by MAGGI TAPIA MD March 30, 2023 14:13 EDT    Dictated Utilizing Dragon Dictation: Part of this note may be an electronic transcription/translation of spoken language to printed text using the Dragon Dictation System.

## 2023-04-02 PROBLEM — K46.9 ENTEROCELE: Status: ACTIVE | Noted: 2023-04-02

## 2023-05-25 ENCOUNTER — TRANSCRIBE ORDERS (OUTPATIENT)
Dept: ADMINISTRATIVE | Facility: HOSPITAL | Age: 63
End: 2023-05-25
Payer: COMMERCIAL

## 2023-05-25 DIAGNOSIS — Z12.31 VISIT FOR SCREENING MAMMOGRAM: Primary | ICD-10-CM

## 2023-08-07 ENCOUNTER — HOSPITAL ENCOUNTER (OUTPATIENT)
Dept: CT IMAGING | Facility: HOSPITAL | Age: 63
Discharge: HOME | End: 2023-08-07
Admitting: NURSE PRACTITIONER
Payer: COMMERCIAL

## 2023-08-07 DIAGNOSIS — R10.31 ABDOMINAL PAIN, RIGHT LOWER QUADRANT: ICD-10-CM

## 2023-08-07 PROCEDURE — 74176 CT ABD & PELVIS W/O CONTRAST: CPT | Performed by: RADIOLOGY

## 2023-08-07 PROCEDURE — 74176 CT ABD & PELVIS W/O CONTRAST: CPT

## 2023-08-15 ENCOUNTER — TRANSCRIBE ORDERS (OUTPATIENT)
Dept: ADMINISTRATIVE | Facility: HOSPITAL | Age: 63
End: 2023-08-15
Payer: COMMERCIAL

## 2023-08-15 DIAGNOSIS — R42 DIZZINESS AND GIDDINESS: Primary | ICD-10-CM

## 2023-09-01 ENCOUNTER — HOSPITAL ENCOUNTER (OUTPATIENT)
Dept: CARDIOLOGY | Facility: HOSPITAL | Age: 63
Discharge: HOME | End: 2023-09-01
Payer: COMMERCIAL

## 2023-09-01 DIAGNOSIS — R42 DIZZINESS AND GIDDINESS: ICD-10-CM

## 2023-09-01 PROCEDURE — 93880 EXTRACRANIAL BILAT STUDY: CPT

## 2023-10-05 DIAGNOSIS — M25.562 PAIN IN BOTH KNEES, UNSPECIFIED CHRONICITY: Primary | ICD-10-CM

## 2023-10-05 DIAGNOSIS — M25.561 PAIN IN BOTH KNEES, UNSPECIFIED CHRONICITY: Primary | ICD-10-CM

## 2023-11-01 ENCOUNTER — HOSPITAL ENCOUNTER (OUTPATIENT)
Dept: GENERAL RADIOLOGY | Facility: HOSPITAL | Age: 63
Discharge: HOME | End: 2023-11-01
Admitting: ORTHOPAEDIC SURGERY
Payer: COMMERCIAL

## 2023-11-01 ENCOUNTER — OFFICE VISIT (OUTPATIENT)
Dept: ORTHOPEDIC SURGERY | Facility: CLINIC | Age: 63
End: 2023-11-01
Payer: COMMERCIAL

## 2023-11-01 VITALS
BODY MASS INDEX: 36.66 KG/M2 | SYSTOLIC BLOOD PRESSURE: 135 MMHG | WEIGHT: 220.02 LBS | HEART RATE: 59 BPM | HEIGHT: 65 IN | DIASTOLIC BLOOD PRESSURE: 80 MMHG

## 2023-11-01 DIAGNOSIS — M25.561 PAIN IN BOTH KNEES, UNSPECIFIED CHRONICITY: ICD-10-CM

## 2023-11-01 DIAGNOSIS — M17.0 PRIMARY OSTEOARTHRITIS OF BOTH KNEES: Primary | ICD-10-CM

## 2023-11-01 DIAGNOSIS — M25.562 PAIN IN BOTH KNEES, UNSPECIFIED CHRONICITY: ICD-10-CM

## 2023-11-01 PROCEDURE — 73562 X-RAY EXAM OF KNEE 3: CPT | Performed by: RADIOLOGY

## 2023-11-01 PROCEDURE — 73562 X-RAY EXAM OF KNEE 3: CPT

## 2023-11-01 RX ADMIN — LIDOCAINE HYDROCHLORIDE 5 ML: 10 INJECTION, SOLUTION EPIDURAL; INFILTRATION; INTRACAUDAL; PERINEURAL at 13:05

## 2023-11-01 RX ADMIN — METHYLPREDNISOLONE ACETATE 40 MG: 40 INJECTION, SUSPENSION INTRA-ARTICULAR; INTRALESIONAL; INTRAMUSCULAR; SOFT TISSUE at 13:05

## 2023-11-01 NOTE — PROGRESS NOTES
New Patient Visit      Patient: Brianna Jones  YOB: 1960  Date of Encounter: 11/01/2023        Chief Complaint:   Chief Complaint   Patient presents with    Left Knee - Pain, Initial Evaluation    Right Knee - Pain           HPI:   Brianna Jones, 62 y.o. female, referred by Suze Barrera APRN presents evaluation of bilateral knee pain left greater than right ongoing for many years.  She underwent arthroscopy left knee area of 2019 and received benefit.  Her left knee is currently her most symptomatic she complains of pain with prolonged sitting or standing has pain and limitation with bending her knees.  She does not experience weakness or numbness to either leg.        Active Problem List:  Patient Active Problem List   Diagnosis    Midline cystocele    Primary osteoarthritis of both knees    Tear of medial meniscus of left knee, current    Gross hematuria    Enterocele           Past Medical History:  Past Medical History:   Diagnosis Date    Arthritis     Asthma     Carotid stenosis, asymptomatic, right 10/2/2019    Constipation     Dyslipidemia     Esophageal stricture     Frequency of urination     GERD (gastroesophageal reflux disease)     History of uterine prolapse     Hypertension     Tremor     UTI (urinary tract infection)            Past Surgical History:  Past Surgical History:   Procedure Laterality Date    ABDOMINAL SURGERY      ANTERIOR VAGINAL REPAIR N/A 06/27/2017    Procedure: ANTERIOR  REPAIR;  Surgeon: Jose Ellison MD;  Location: [facility];  Service:     BREAST CYST EXCISION Right 1999    BREAST SURGERY Right     Lumpectomy    KNEE ARTHROSCOPY Left 02/22/2018    Procedure: LEFT KNEE ARTHROSCOPY WITH PARTIAL MEDIAL MENISCECTOMY,  CHONDROPLASTY;  Surgeon: Johnie John MD;  Location: [facility];  Service:     LAPAROSCOPIC CHOLECYSTECTOMY      LAPAROSCOPIC TUBAL LIGATION      TONSILLECTOMY      TOTAL LAPAROSCOPIC HYSTERECTOMY N/A 06/27/2017    Procedure: TOTAL  LAPAROSCOPIC HYSTERECTOMY BSO WITH DAVINCI SI ROBOT;  Surgeon: Jose Ellison MD;  Location: [facility] OR;  Service:     TRANSVAGINAL TAPING SUSPENSION N/A 06/27/2017    Procedure: RETROPUBIC TENSION FREE VAGINAL TAPING;  Surgeon: Jose Ellison MD;  Location: [facility] OR;  Service:            Family History:  Family History   Problem Relation Age of Onset    Hypertension Mother     Hypertension Father     Heart attack Father     Asthma Brother     Breast cancer Neg Hx          Social History:  Social History     Socioeconomic History    Marital status:     Number of children: 1   Tobacco Use    Smoking status: Never    Smokeless tobacco: Never   Vaping Use    Vaping Use: Never used   Substance and Sexual Activity    Alcohol use: No    Drug use: No    Sexual activity: Defer     Birth control/protection: Surgical     Body mass index is 36.61 kg/m².      Medications:  Current Outpatient Medications   Medication Sig Dispense Refill    ASPIRIN LOW DOSE 81 MG EC tablet       atorvastatin (LIPITOR) 10 MG tablet       celecoxib (CeleBREX) 200 MG capsule Take 1 capsule by mouth Daily.      Cholecalciferol (VITAMIN D3) 5000 units tablet tablet Take 1 tablet by mouth Daily.      levocetirizine (XYZAL) 5 MG tablet Take 1 tablet by mouth Every Evening.      lisinopril (PRINIVIL,ZESTRIL) 20 MG tablet Take 1 tablet by mouth Daily.      pantoprazole (PROTONIX) 20 MG EC tablet Take 1 tablet by mouth Daily.      montelukast (SINGULAIR) 10 MG tablet Take 10 mg by mouth Every Night.       No current facility-administered medications for this visit.         Allergies:  Allergies   Allergen Reactions    Amoxicillin Dermatitis    Clavulanic Acid Hives    Penicillins Rash    Sulfa Antibiotics Other (See Comments)     Patient doesn't not remember the reaction          Review of Systems:   Review of Systems   Constitutional: Negative.  Negative for chills, fatigue and fever.   HENT: Negative.  Negative for congestion, ear pain,  "facial swelling, sore throat, trouble swallowing and voice change.    Eyes:  Negative for pain, discharge, redness and visual disturbance.   Respiratory: Negative.  Negative for apnea, cough, choking, chest tightness, shortness of breath, wheezing and stridor.    Cardiovascular: Negative.  Negative for chest pain, palpitations and leg swelling.   Gastrointestinal: Negative.  Negative for abdominal distention, abdominal pain, blood in stool, nausea and vomiting.   Endocrine: Negative.  Negative for cold intolerance, heat intolerance, polydipsia and polyphagia.   Genitourinary: Negative.  Negative for difficulty urinating, dysuria, flank pain, frequency and hematuria.   Musculoskeletal:  Positive for arthralgias.   Skin: Negative.  Negative for color change, pallor, rash and wound.   Allergic/Immunologic: Negative.  Negative for environmental allergies, food allergies and immunocompromised state.   Neurological: Negative.  Negative for dizziness, tremors, seizures, syncope, speech difficulty, weakness, light-headedness, numbness and headaches.   Hematological: Negative.  Negative for adenopathy. Does not bruise/bleed easily.   Psychiatric/Behavioral: Negative.  Negative for behavioral problems, confusion, dysphoric mood, self-injury, sleep disturbance and suicidal ideas. The patient is not nervous/anxious.          Physical Exam:   Physical Exam  GENERAL: 62 y.o. female, alert and oriented X 3 in no acute distress.   Visit Vitals  /80   Pulse 59   Ht 165.1 cm (65\")   Wt 99.8 kg (220 lb 0.3 oz)   BMI 36.61 kg/m²       GENERAL APPEARANCE: Awake, alert & oriented, in no acute distress and well developed, well nourished.   PSYCH: Normal mood and affect  LUNGS: Breathing nonlabored, no wheezing  EYES: Sclera anicteric, pupils equal  CARDIOVASCULAR: Palpable pulses. Capillary refill less than 2 seconds  INTEGUMENTARY: Skin intact, co clubbing, cyanosis  NEUROLOGIC: Normal Sensation  MUSCULOSKELETAL:  Orthopedic " Examination: Left knee demonstrates mild effusion with 5 degree flexion contracture and mild varus alignment further flexion limited to 90 degrees.  No gross instability with varus valgus stressing Lachman or drawer patella with moderate crepitance with flexion extension.  Normal tracking.  Valuation demonstrates full extension with flexion to 90 degrees no gross instability with varus valgus stressing Lachman or drawer.  Patella with normal tracking mild crepitance neurovascular exam grossly intact.        Radiology/Labs:     XR Knee 3 View Bilateral    Result Date: 11/1/2023  1.  No acute fracture or dislocation. 2.  Extensive degenerative osteoarthritis.   This report was finalized on 11/1/2023 1:41 PM by Dr. Nathaniel Chung MD.           Radiographs viewed of bilateral knees obtained today weightbearing show complete loss of medial joint space left and right with moderate varus alignment left advanced patellofemoral joint osteoarthritis.          Assessment & Plan:   62 y.o. female presents with bilateral knee complaints and danced osteoarthritis medial compartment bilateral knees left greater than right and advanced patellofemoral osteoarthritis.  She has received intra-articular steroid injections in the past and reports actionable reaction afterwards.  She describes doing odd after the injection somewhat jittery but did not experience localized symptoms to her knee.  Today she was provided intra-articular steroid injection Depo-Medrol 40 mg lidocaine block left knee.  We will consider treating her right knee in the near future.  Should she again experience adverse reaction afterwards she may be best suited for viscous injection.  We did discuss the possibility of total knee arthroplasty.        ICD-10-CM ICD-9-CM   1. Primary osteoarthritis of both knees  M17.0 715.16         Large Joint Arthrocentesis: L knee  Date/Time: 11/1/2023 1:05 PM  Consent given by: patient  Site marked: site marked  Timeout:  Immediately prior to procedure a time out was called to verify the correct patient, procedure, equipment, support staff and site/side marked as required   Supporting Documentation  Indications: pain   Procedure Details  Location: knee - L knee  Preparation: Patient was prepped and draped in the usual sterile fashion  Needle size: 25 G  Approach: anterolateral  Medications administered: 5 mL lidocaine PF 1% 1 %; 40 mg methylPREDNISolone acetate 40 MG/ML  Patient tolerance: patient tolerated the procedure well with no immediate complications        Cc:   Suze Barrera, APRN                This document has been electronically signed by Johnie John MD   November 3, 2023 13:04 EDT

## 2023-11-04 RX ORDER — LIDOCAINE HYDROCHLORIDE 10 MG/ML
5 INJECTION, SOLUTION EPIDURAL; INFILTRATION; INTRACAUDAL; PERINEURAL
Status: COMPLETED | OUTPATIENT
Start: 2023-11-01 | End: 2023-11-01

## 2023-11-04 RX ORDER — METHYLPREDNISOLONE ACETATE 40 MG/ML
40 INJECTION, SUSPENSION INTRA-ARTICULAR; INTRALESIONAL; INTRAMUSCULAR; SOFT TISSUE
Status: COMPLETED | OUTPATIENT
Start: 2023-11-01 | End: 2023-11-01

## 2024-01-03 ENCOUNTER — OFFICE VISIT (OUTPATIENT)
Dept: ORTHOPEDIC SURGERY | Facility: CLINIC | Age: 64
End: 2024-01-03
Payer: COMMERCIAL

## 2024-01-03 ENCOUNTER — HOSPITAL ENCOUNTER (OUTPATIENT)
Dept: GENERAL RADIOLOGY | Facility: HOSPITAL | Age: 64
Discharge: HOME | End: 2024-01-03
Admitting: ORTHOPAEDIC SURGERY
Payer: COMMERCIAL

## 2024-01-03 VITALS — WEIGHT: 220.02 LBS | BODY MASS INDEX: 36.66 KG/M2 | HEIGHT: 65 IN

## 2024-01-03 DIAGNOSIS — M25.561 CHRONIC PAIN OF RIGHT KNEE: Primary | ICD-10-CM

## 2024-01-03 DIAGNOSIS — G89.29 CHRONIC PAIN OF RIGHT KNEE: Primary | ICD-10-CM

## 2024-01-03 DIAGNOSIS — M17.11 PRIMARY OSTEOARTHRITIS OF RIGHT KNEE: ICD-10-CM

## 2024-01-03 PROCEDURE — 73562 X-RAY EXAM OF KNEE 3: CPT

## 2024-01-03 RX ADMIN — LIDOCAINE HYDROCHLORIDE 5 ML: 10 INJECTION, SOLUTION EPIDURAL; INFILTRATION; INTRACAUDAL; PERINEURAL at 13:37

## 2024-01-03 RX ADMIN — METHYLPREDNISOLONE ACETATE 40 MG: 40 INJECTION, SUSPENSION INTRA-ARTICULAR; INTRALESIONAL; INTRAMUSCULAR; SOFT TISSUE at 13:37

## 2024-01-03 NOTE — PROGRESS NOTES
Follow-up Visit         Patient: Brianna Jones  YOB: 1960  Date of Encounter: 01/03/2024      Chief  Complaint:   Chief Complaint   Patient presents with    Left Knee - Pain, Follow-up    Right Knee - Pain, Follow-up         HPI:  Brianna Jones, 63 y.o. female presents in follow-up with new complaint of right knee pain acute on chronic she has had mild knee complaints but experienced a popping sensation in her knee 5 days ago when she got out of a car.  He had difficulty bearing weight but this improved and she reports now 5 days after the incident her knee is moderately better she has had no further episodes of popping.  She denies giving way or locking.  He has received intra-articular steroid injection left knee November first 2023 and reports that she received modest improvement.        Medical History:  Patient Active Problem List   Diagnosis    Midline cystocele    Primary osteoarthritis of both knees    Tear of medial meniscus of left knee, current    Gross hematuria    Enterocele     Past Medical History:   Diagnosis Date    Arthritis     Asthma     Carotid stenosis, asymptomatic, right 10/2/2019    Constipation     Dyslipidemia     Esophageal stricture     Frequency of urination     GERD (gastroesophageal reflux disease)     History of uterine prolapse     Hypertension     Tremor     UTI (urinary tract infection)            Social History:  Social History     Socioeconomic History    Marital status:     Number of children: 1   Tobacco Use    Smoking status: Never    Smokeless tobacco: Never   Vaping Use    Vaping Use: Never used   Substance and Sexual Activity    Alcohol use: No    Drug use: No    Sexual activity: Defer     Birth control/protection: Surgical           Current Medications:    Current Outpatient Medications:     ASPIRIN LOW DOSE 81 MG EC tablet, , Disp: , Rfl:     atorvastatin (LIPITOR) 10 MG tablet, , Disp: , Rfl:     celecoxib (CeleBREX) 200 MG capsule,  Take 1 capsule by mouth Daily., Disp: , Rfl:     Cholecalciferol (VITAMIN D3) 5000 units tablet tablet, Take 1 tablet by mouth Daily., Disp: , Rfl:     levocetirizine (XYZAL) 5 MG tablet, Take 1 tablet by mouth Every Evening., Disp: , Rfl:     lisinopril (PRINIVIL,ZESTRIL) 20 MG tablet, Take 1 tablet by mouth Daily., Disp: , Rfl:     montelukast (SINGULAIR) 10 MG tablet, Take 1 tablet by mouth Every Night., Disp: , Rfl:     pantoprazole (PROTONIX) 20 MG EC tablet, Take 1 tablet by mouth Daily., Disp: , Rfl:         Allergies:  Allergies   Allergen Reactions    Amoxicillin Dermatitis    Clavulanic Acid Hives    Penicillins Rash    Sulfa Antibiotics Other (See Comments)     Patient doesn't not remember the reaction            Family History:  Family History   Problem Relation Age of Onset    Hypertension Mother     Hypertension Father     Heart attack Father     Asthma Brother     Breast cancer Neg Hx            Surgical History:  Past Surgical History:   Procedure Laterality Date    ABDOMINAL SURGERY      ANTERIOR VAGINAL REPAIR N/A 06/27/2017    Procedure: ANTERIOR  REPAIR;  Surgeon: Jose Ellison MD;  Location: [facility] OR;  Service:     BREAST CYST EXCISION Right 1999    BREAST SURGERY Right     Lumpectomy    KNEE ARTHROSCOPY Left 02/22/2018    Procedure: LEFT KNEE ARTHROSCOPY WITH PARTIAL MEDIAL MENISCECTOMY,  CHONDROPLASTY;  Surgeon: Johnie John MD;  Location: [facility] OR;  Service:     LAPAROSCOPIC CHOLECYSTECTOMY      LAPAROSCOPIC TUBAL LIGATION      TONSILLECTOMY      TOTAL LAPAROSCOPIC HYSTERECTOMY N/A 06/27/2017    Procedure: TOTAL LAPAROSCOPIC HYSTERECTOMY BSO WITH DAVINCI SI ROBOT;  Surgeon: Jose Ellison MD;  Location: [facility] OR;  Service:     TRANSVAGINAL TAPING SUSPENSION N/A 06/27/2017    Procedure: RETROPUBIC TENSION FREE VAGINAL TAPING;  Surgeon: Jose Ellison MD;  Location: [facility] OR;  Service:            Radiology:   XR Knee 3 View Right    Result Date: 1/3/2024    Extensive  degenerative osteoarthritis, but no acute fracture or dislocation.   This report was finalized on 1/3/2024 3:29 PM by Dr. Nathaniel Chung MD.           Radiographs taken today and reviewed right knee weightbearing show absence of medial joint space with moderate varus alignment mild patellofemoral joint arthrosis.          Orthopedic Examination: Right knee reveals moderate medial joint line tenderness with minimal effusion she demonstrates full extension flexion beyond 90 degrees no gross instability with varus valgus stressing Lachman or drawer neurovascular exam is grossly intact.          Assessment & Plan:   63 y.o. female presents with acute on chronic right knee complaints known osteoarthritis of the mild degree she has not received treatment in the past.  Today's exam and radiographs demonstrate findings consistent with moderate osteoarthritis primarily medial compartment after discussing options she was provided intra-articular steroid injection Depo-Medrol 40 mg with lidocaine block.           Diagnosis Plan   1. Chronic pain of right knee  XR Knee 3 View Right      2. Primary osteoarthritis of right knee              Large Joint Arthrocentesis: R knee  Date/Time: 1/3/2024 1:37 PM  Consent given by: patient  Site marked: site marked  Timeout: Immediately prior to procedure a time out was called to verify the correct patient, procedure, equipment, support staff and site/side marked as required   Supporting Documentation  Indications: pain   Procedure Details  Location: knee - R knee  Preparation: Patient was prepped and draped in the usual sterile fashion  Needle size: 25 G  Approach: anterolateral  Medications administered: 40 mg methylPREDNISolone acetate 40 MG/ML; 5 mL lidocaine PF 1% 1 %  Patient tolerance: patient tolerated the procedure well with no immediate complications          Cc:  Suze Barrera APRN              This document has been electronically signed by Johnie John MD   January  5, 2024 13:36 EST

## 2024-01-05 RX ORDER — LIDOCAINE HYDROCHLORIDE 10 MG/ML
5 INJECTION, SOLUTION EPIDURAL; INFILTRATION; INTRACAUDAL; PERINEURAL
Status: COMPLETED | OUTPATIENT
Start: 2024-01-03 | End: 2024-01-03

## 2024-01-05 RX ORDER — METHYLPREDNISOLONE ACETATE 40 MG/ML
40 INJECTION, SUSPENSION INTRA-ARTICULAR; INTRALESIONAL; INTRAMUSCULAR; SOFT TISSUE
Status: COMPLETED | OUTPATIENT
Start: 2024-01-03 | End: 2024-01-03

## 2024-04-03 ENCOUNTER — OFFICE VISIT (OUTPATIENT)
Dept: ORTHOPEDIC SURGERY | Facility: CLINIC | Age: 64
End: 2024-04-03
Payer: COMMERCIAL

## 2024-04-03 VITALS — HEIGHT: 65 IN | BODY MASS INDEX: 36.65 KG/M2 | WEIGHT: 220 LBS

## 2024-04-03 DIAGNOSIS — M17.0 PRIMARY OSTEOARTHRITIS OF BOTH KNEES: Primary | ICD-10-CM

## 2024-04-03 RX ORDER — OMEPRAZOLE 40 MG/1
CAPSULE, DELAYED RELEASE ORAL
COMMUNITY
Start: 2024-03-05

## 2024-04-03 RX ADMIN — METHYLPREDNISOLONE ACETATE 40 MG: 40 INJECTION, SUSPENSION INTRA-ARTICULAR; INTRALESIONAL; INTRAMUSCULAR; SOFT TISSUE at 19:35

## 2024-04-03 RX ADMIN — METHYLPREDNISOLONE ACETATE 40 MG: 40 INJECTION, SUSPENSION INTRA-ARTICULAR; INTRALESIONAL; INTRAMUSCULAR; SOFT TISSUE at 19:32

## 2024-04-03 RX ADMIN — LIDOCAINE HYDROCHLORIDE 5 ML: 10 INJECTION, SOLUTION EPIDURAL; INFILTRATION; INTRACAUDAL; PERINEURAL at 19:32

## 2024-04-03 RX ADMIN — LIDOCAINE HYDROCHLORIDE 5 ML: 10 INJECTION, SOLUTION EPIDURAL; INFILTRATION; INTRACAUDAL; PERINEURAL at 19:35

## 2024-04-03 NOTE — PROGRESS NOTES
Follow-up Visit         Patient: Brianna Jones  YOB: 1960  Date of Encounter: 04/03/2024      Chief  Complaint:   Chief Complaint   Patient presents with    Right Knee - Follow-up, Pain    Left Knee - Follow-up, Pain         HPI:  Brianna Jones, 63 y.o. female presents in follow-up bilateral knee pain and known bilateral knee osteoarthritis advanced.  Presents today with return of bilateral knee pain after receiving intra-articular steroid injection right knee January 2024.  He has received steroid injection left knee in the past with good response November 2023.  Presents today requesting treatment of both knees.        Medical History:  Patient Active Problem List   Diagnosis    Midline cystocele    Primary osteoarthritis of both knees    Tear of medial meniscus of left knee, current    Gross hematuria    Enterocele     Past Medical History:   Diagnosis Date    Arthritis     Asthma     Carotid stenosis, asymptomatic, right 10/02/2019    Constipation     CTS (carpal tunnel syndrome)     Dyslipidemia     Esophageal stricture     Frequency of urination     GERD (gastroesophageal reflux disease)     History of uterine prolapse     Hypertension     Knee swelling     Tear of meniscus of knee     Tremor     UTI (urinary tract infection)            Social History:  Social History     Socioeconomic History    Marital status:     Number of children: 1   Tobacco Use    Smoking status: Never    Smokeless tobacco: Never   Vaping Use    Vaping status: Never Used   Substance and Sexual Activity    Alcohol use: No    Drug use: No    Sexual activity: Defer     Birth control/protection: Surgical           Current Medications:    Current Outpatient Medications:     ASPIRIN LOW DOSE 81 MG EC tablet, , Disp: , Rfl:     atorvastatin (LIPITOR) 10 MG tablet, , Disp: , Rfl:     celecoxib (CeleBREX) 200 MG capsule, Take 1 capsule by mouth Daily., Disp: , Rfl:     Cholecalciferol (VITAMIN D3) 5000 units  tablet tablet, Take 1 tablet by mouth Daily., Disp: , Rfl:     levocetirizine (XYZAL) 5 MG tablet, Take 1 tablet by mouth Every Evening., Disp: , Rfl:     lisinopril (PRINIVIL,ZESTRIL) 20 MG tablet, Take 1 tablet by mouth Daily., Disp: , Rfl:     montelukast (SINGULAIR) 10 MG tablet, Take 1 tablet by mouth Every Night., Disp: , Rfl:     omeprazole (priLOSEC) 40 MG capsule, , Disp: , Rfl:     pantoprazole (PROTONIX) 20 MG EC tablet, Take 1 tablet by mouth Daily., Disp: , Rfl:         Allergies:  Allergies   Allergen Reactions    Amoxicillin Dermatitis    Clavulanic Acid Hives    Penicillins Rash    Sulfa Antibiotics Other (See Comments)     Patient doesn't not remember the reaction            Family History:  Family History   Problem Relation Age of Onset    Hypertension Mother     Hypertension Father     Heart attack Father     Asthma Brother     Breast cancer Neg Hx            Surgical History:  Past Surgical History:   Procedure Laterality Date    ABDOMINAL SURGERY      ANTERIOR VAGINAL REPAIR N/A 06/27/2017    Procedure: ANTERIOR  REPAIR;  Surgeon: Jose Ellison MD;  Location[facility];  Service:     BREAST CYST EXCISION Right 1999    BREAST SURGERY Right     Lumpectomy    KNEE ARTHROSCOPY Left 02/22/2018    Procedure: LEFT KNEE ARTHROSCOPY WITH PARTIAL MEDIAL MENISCECTOMY,  CHONDROPLASTY;  Surgeon: Johnie John MD;  Location: [facility] OR;  Service:     KNEE SURGERY      LAPAROSCOPIC CHOLECYSTECTOMY      LAPAROSCOPIC TUBAL LIGATION      TONSILLECTOMY      TOTAL LAPAROSCOPIC HYSTERECTOMY N/A 06/27/2017    Procedure: TOTAL LAPAROSCOPIC HYSTERECTOMY BSO WITH Impulse[employer] SI ROBOT;  Surgeon: Jose Ellison MD;  Location: [facility] OR;  Service:     TRANSVAGINAL TAPING SUSPENSION N/A 06/27/2017    Procedure: RETROPUBIC TENSION FREE VAGINAL TAPING;  Surgeon: Jose Ellison MD;  Location: [facility] OR;  Service:            Radiology:   Radiographs reviewed today of bilateral knees show advanced osteoarthritis  medial compartment with complete collapse medial compartment bilateral knees and mild varus alignment.          Orthopedic Examination: Bilateral knees demonstrates minimal effusion mild varus alignment marked medial joint line tenderness bilaterally with no gross instability.          Assessment & Plan:   63 y.o. female presents with advanced osteoarthritis medial compartment bilateral knees.  We discussed her options today and she is provided intra-articular steroid injection Depo-Medrol 40 mg lidocaine block bilateral knees.  She eventually require total knee arthroplasties to gain control of her pain she is heavily fairly limited response to intra-articular steroid injection she will return as needed.           Diagnosis Plan   1. Primary osteoarthritis of both knees              Large Joint Arthrocentesis: L knee  Date/Time: 4/3/2024 7:32 PM  Consent given by: patient  Site marked: site marked  Timeout: Immediately prior to procedure a time out was called to verify the correct patient, procedure, equipment, support staff and site/side marked as required   Supporting Documentation  Indications: pain   Procedure Details  Location: knee - L knee  Preparation: Patient was prepped and draped in the usual sterile fashion  Needle size: 25 G  Approach: anterolateral  Medications administered: 5 mL lidocaine PF 1% 1 %; 40 mg methylPREDNISolone acetate 40 MG/ML  Patient tolerance: patient tolerated the procedure well with no immediate complications      Large Joint Arthrocentesis: R knee  Date/Time: 4/3/2024 7:35 PM  Consent given by: patient  Site marked: site marked  Timeout: Immediately prior to procedure a time out was called to verify the correct patient, procedure, equipment, support staff and site/side marked as required   Supporting Documentation  Indications: pain   Procedure Details  Location: knee - R knee  Preparation: Patient was prepped and draped in the usual sterile fashion  Needle size: 25 G  Approach:  anterolateral  Medications administered: 40 mg methylPREDNISolone acetate 40 MG/ML; 5 mL lidocaine PF 1% 1 %  Patient tolerance: patient tolerated the procedure well with no immediate complications          Cc:  Suze Barrera, STEPHEN              This document has been electronically signed by Johnie John MD   April 4, 2024 19:32 EDT

## 2024-04-05 RX ORDER — LIDOCAINE HYDROCHLORIDE 10 MG/ML
5 INJECTION, SOLUTION EPIDURAL; INFILTRATION; INTRACAUDAL; PERINEURAL
Status: COMPLETED | OUTPATIENT
Start: 2024-04-03 | End: 2024-04-03

## 2024-04-05 RX ORDER — METHYLPREDNISOLONE ACETATE 40 MG/ML
40 INJECTION, SUSPENSION INTRA-ARTICULAR; INTRALESIONAL; INTRAMUSCULAR; SOFT TISSUE
Status: COMPLETED | OUTPATIENT
Start: 2024-04-03 | End: 2024-04-03

## 2025-03-04 ENCOUNTER — TRANSCRIBE ORDERS (OUTPATIENT)
Dept: ADMINISTRATIVE | Facility: HOSPITAL | Age: 65
End: 2025-03-04
Payer: COMMERCIAL

## 2025-03-04 DIAGNOSIS — R42 DIZZINESS AND GIDDINESS: Primary | ICD-10-CM

## 2025-05-06 ENCOUNTER — TELEPHONE (OUTPATIENT)
Dept: ORTHOPEDIC SURGERY | Facility: CLINIC | Age: 65
End: 2025-05-06

## 2025-05-06 NOTE — TELEPHONE ENCOUNTER
Caller: Brianna Jones    Relationship: Self    Best call back number: [phone]    What was the call regarding: PT CALLING TO REQUEST MOST RECENT CELENA KNEE XRAY WITH DR WILCOX TO BE BURNED ON A DISC SO THAT PT CAN PICK IT UP AT [place] OFFICE. PLEASE CONTACT PT WHEN READY FOR PICKUP.

## 2025-06-04 ENCOUNTER — TRANSCRIBE ORDERS (OUTPATIENT)
Dept: ADMINISTRATIVE | Facility: HOSPITAL | Age: 65
End: 2025-06-04
Payer: COMMERCIAL

## 2025-06-04 ENCOUNTER — HOSPITAL ENCOUNTER (OUTPATIENT)
Dept: RESPIRATORY THERAPY | Facility: HOSPITAL | Age: 65
Discharge: HOME | End: 2025-06-04
Payer: COMMERCIAL

## 2025-06-04 ENCOUNTER — HOSPITAL ENCOUNTER (OUTPATIENT)
Dept: GENERAL RADIOLOGY | Facility: HOSPITAL | Age: 65
Discharge: HOME | End: 2025-06-04
Payer: COMMERCIAL

## 2025-06-04 ENCOUNTER — LAB (OUTPATIENT)
Dept: LAB | Facility: HOSPITAL | Age: 65
End: 2025-06-04
Payer: COMMERCIAL

## 2025-06-04 DIAGNOSIS — Z01.818 PREOP TESTING: ICD-10-CM

## 2025-06-04 DIAGNOSIS — Z01.818 PREOP TESTING: Primary | ICD-10-CM

## 2025-06-04 LAB
INR PPP: 1 (ref 0.9–1.1)
PROTHROMBIN TIME: 13.8 SECONDS (ref 12.5–15.2)
QT INTERVAL: 396 MS
QTC INTERVAL: 411 MS

## 2025-06-04 PROCEDURE — 93005 ELECTROCARDIOGRAM TRACING: CPT | Performed by: ORTHOPAEDIC SURGERY

## 2025-06-04 PROCEDURE — 71046 X-RAY EXAM CHEST 2 VIEWS: CPT

## 2025-06-04 PROCEDURE — 36415 COLL VENOUS BLD VENIPUNCTURE: CPT

## 2025-06-04 PROCEDURE — 85610 PROTHROMBIN TIME: CPT

## 2025-06-04 PROCEDURE — 85025 COMPLETE CBC W/AUTO DIFF WBC: CPT

## 2025-06-04 PROCEDURE — 83036 HEMOGLOBIN GLYCOSYLATED A1C: CPT

## 2025-06-04 PROCEDURE — 80053 COMPREHEN METABOLIC PANEL: CPT

## 2025-06-05 LAB
ALBUMIN SERPL-MCNC: 3.8 G/DL (ref 3.5–5.2)
ALBUMIN/GLOB SERPL: 1.2 G/DL
ALP SERPL-CCNC: 79 U/L (ref 39–117)
ALT SERPL W P-5'-P-CCNC: 13 U/L (ref 1–33)
ANION GAP SERPL CALCULATED.3IONS-SCNC: 9 MMOL/L (ref 5–15)
AST SERPL-CCNC: 17 U/L (ref 1–32)
BASOPHILS # BLD AUTO: 0.04 10*3/MM3 (ref 0–0.2)
BASOPHILS NFR BLD AUTO: 0.4 % (ref 0–1.5)
BILIRUB SERPL-MCNC: 0.5 MG/DL (ref 0–1.2)
BUN SERPL-MCNC: 12 MG/DL (ref 8–23)
BUN/CREAT SERPL: 14.5 (ref 7–25)
CALCIUM SPEC-SCNC: 9 MG/DL (ref 8.6–10.5)
CHLORIDE SERPL-SCNC: 107 MMOL/L (ref 98–107)
CO2 SERPL-SCNC: 20 MMOL/L (ref 22–29)
CREAT SERPL-MCNC: 0.83 MG/DL (ref 0.57–1)
DEPRECATED RDW RBC AUTO: 44.1 FL (ref 37–54)
EGFRCR SERPLBLD CKD-EPI 2021: 79.3 ML/MIN/1.73
EOSINOPHIL # BLD AUTO: 0.22 10*3/MM3 (ref 0–0.4)
EOSINOPHIL NFR BLD AUTO: 2.1 % (ref 0.3–6.2)
ERYTHROCYTE [DISTWIDTH] IN BLOOD BY AUTOMATED COUNT: 13.8 % (ref 12.3–15.4)
GLOBULIN UR ELPH-MCNC: 3.1 GM/DL
GLUCOSE SERPL-MCNC: 82 MG/DL (ref 65–99)
HBA1C MFR BLD: 5.1 % (ref 4.8–5.6)
HCT VFR BLD AUTO: 36.2 % (ref 34–46.6)
HGB BLD-MCNC: 11.8 G/DL (ref 12–15.9)
IMM GRANULOCYTES # BLD AUTO: 0.02 10*3/MM3 (ref 0–0.05)
IMM GRANULOCYTES NFR BLD AUTO: 0.2 % (ref 0–0.5)
LYMPHOCYTES # BLD AUTO: 3.03 10*3/MM3 (ref 0.7–3.1)
LYMPHOCYTES NFR BLD AUTO: 29 % (ref 19.6–45.3)
MCH RBC QN AUTO: 28.4 PG (ref 26.6–33)
MCHC RBC AUTO-ENTMCNC: 32.6 G/DL (ref 31.5–35.7)
MCV RBC AUTO: 87 FL (ref 79–97)
MONOCYTES # BLD AUTO: 0.86 10*3/MM3 (ref 0.1–0.9)
MONOCYTES NFR BLD AUTO: 8.2 % (ref 5–12)
NEUTROPHILS NFR BLD AUTO: 6.29 10*3/MM3 (ref 1.7–7)
NEUTROPHILS NFR BLD AUTO: 60.1 % (ref 42.7–76)
NRBC BLD AUTO-RTO: 0 /100 WBC (ref 0–0.2)
PLATELET # BLD AUTO: 280 10*3/MM3 (ref 140–450)
PMV BLD AUTO: 10 FL (ref 6–12)
POTASSIUM SERPL-SCNC: 4.3 MMOL/L (ref 3.5–5.2)
PROT SERPL-MCNC: 6.9 G/DL (ref 6–8.5)
RBC # BLD AUTO: 4.16 10*6/MM3 (ref 3.77–5.28)
SODIUM SERPL-SCNC: 136 MMOL/L (ref 136–145)
WBC NRBC COR # BLD AUTO: 10.46 10*3/MM3 (ref 3.4–10.8)

## 2025-07-02 ENCOUNTER — APPOINTMENT (OUTPATIENT)
Dept: GENERAL RADIOLOGY | Facility: HOSPITAL | Age: 65
End: 2025-07-02
Payer: COMMERCIAL

## 2025-07-02 ENCOUNTER — HOSPITAL ENCOUNTER (EMERGENCY)
Facility: HOSPITAL | Age: 65
Discharge: HOME | End: 2025-07-02
Attending: STUDENT IN AN ORGANIZED HEALTH CARE EDUCATION/TRAINING PROGRAM | Admitting: STUDENT IN AN ORGANIZED HEALTH CARE EDUCATION/TRAINING PROGRAM
Payer: COMMERCIAL

## 2025-07-02 VITALS
RESPIRATION RATE: 15 BRPM | DIASTOLIC BLOOD PRESSURE: 67 MMHG | SYSTOLIC BLOOD PRESSURE: 167 MMHG | HEART RATE: 71 BPM | WEIGHT: 220 LBS | HEIGHT: 65 IN | BODY MASS INDEX: 36.65 KG/M2 | TEMPERATURE: 98.1 F | OXYGEN SATURATION: 97 %

## 2025-07-02 DIAGNOSIS — Z98.890 POST-OPERATIVE STATE: ICD-10-CM

## 2025-07-02 DIAGNOSIS — S82.002A CLOSED DISPLACED FRACTURE OF LEFT PATELLA, UNSPECIFIED FRACTURE MORPHOLOGY, INITIAL ENCOUNTER: Primary | ICD-10-CM

## 2025-07-02 PROCEDURE — 73562 X-RAY EXAM OF KNEE 3: CPT

## 2025-07-02 PROCEDURE — 99283 EMERGENCY DEPT VISIT LOW MDM: CPT

## 2025-07-02 PROCEDURE — 73562 X-RAY EXAM OF KNEE 3: CPT | Performed by: RADIOLOGY

## 2025-07-02 NOTE — ED PROVIDER NOTES
Subjective   History of Present Illness  The patient is a 64-year-old female presents to the emergency department for left knee pain.  She states that she had left total knee approximately 2 weeks ago by [facility].  She states that she is scheduled for a telehealth appointment with them in a few days.  However today she was physical therapy continue treatment and to have staples removed.  Patient states that while trying to climb up on the exam table she felt a popping sensation in her left knee.  She states that was immediately followed by pain.  She states that due to the recent surgery and nature of pain she felt be good to come to emergency department for evaluation and treatment.    The patient has an extensive past medical history that includes carotid stenosis right, arthritis, GERD, constipation, tremor, asthma, hypertension, dyslipidemia, and carpal tunnel syndrome.  She did have previous arthroscopy of the left knee as well.    Current medications include aspirin, Lipitor, Celebrex, vitamin D, levocetirizine, lisinopril, Singulair, and omeprazole.    Her allergies include amoxicillin, Augmentin, penicillin, and sulfa's.        Review of Systems   Constitutional: Negative.  Negative for fever.   HENT: Negative.     Respiratory: Negative.     Cardiovascular: Negative.  Negative for chest pain.   Gastrointestinal: Negative.  Negative for abdominal pain.   Endocrine: Negative.    Genitourinary: Negative.  Negative for dysuria.   Musculoskeletal:  Positive for arthralgias (left knee pain).   Skin:         The patient has healing left total knee replacement suture.  There are multiple staples present without wound complications.   Neurological: Negative.    Psychiatric/Behavioral: Negative.     All other systems reviewed and are negative.      Past Medical History:   Diagnosis Date    Arthritis     Asthma     Carotid stenosis, asymptomatic, right 10/02/2019    Constipation     CTS (carpal tunnel  syndrome)     Dyslipidemia     Esophageal stricture     Frequency of urination     GERD (gastroesophageal reflux disease)     History of uterine prolapse     Hypertension     Knee swelling     Tear of meniscus of knee     Tremor     UTI (urinary tract infection)        Allergies   Allergen Reactions    Amoxicillin Dermatitis    Clavulanic Acid Hives    Penicillins Rash    Sulfa Antibiotics Other (See Comments)     Patient doesn't not remember the reaction        Past Surgical History:   Procedure Laterality Date    ABDOMINAL SURGERY      ANTERIOR VAGINAL REPAIR N/A 06/27/2017    Procedure: ANTERIOR  REPAIR;  Surgeon: Jose Ellison MD;  Location: [facility] OR;  Service:     BREAST CYST EXCISION Right 1999    BREAST SURGERY Right     Lumpectomy    KNEE ARTHROSCOPY Left 02/22/2018    Procedure: LEFT KNEE ARTHROSCOPY WITH PARTIAL MEDIAL MENISCECTOMY,  CHONDROPLASTY;  Surgeon: Johnie John MD;  Location: [facility] OR;  Service:     KNEE SURGERY      LAPAROSCOPIC CHOLECYSTECTOMY      LAPAROSCOPIC TUBAL LIGATION      TONSILLECTOMY      TOTAL LAPAROSCOPIC HYSTERECTOMY N/A 06/27/2017    Procedure: TOTAL LAPAROSCOPIC HYSTERECTOMY BSO WITH DAVINCI SI ROBOT;  Surgeon: Jose Ellison MD;  Location: [facility] OR;  Service:     TRANSVAGINAL TAPING SUSPENSION N/A 06/27/2017    Procedure: RETROPUBIC TENSION FREE VAGINAL TAPING;  Surgeon: Jose Ellison MD;  Location: [facility] OR;  Service:        Family History   Problem Relation Age of Onset    Hypertension Mother     Hypertension Father     Heart attack Father     Asthma Brother     Breast cancer Neg Hx        Social History     Socioeconomic History    Marital status:     Number of children: 1   Tobacco Use    Smoking status: Never    Smokeless tobacco: Never   Vaping Use    Vaping status: Never Used   Substance and Sexual Activity    Alcohol use: No    Drug use: No    Sexual activity: Defer     Birth control/protection: Surgical           Objective   Physical  Exam  Vitals and nursing note reviewed.   Constitutional:       General: She is not in acute distress.     Appearance: She is well-developed. She is not diaphoretic.   HENT:      Head: Normocephalic and atraumatic.      Right Ear: External ear normal.      Left Ear: External ear normal.      Nose: Nose normal.   Eyes:      Conjunctiva/sclera: Conjunctivae normal.      Pupils: Pupils are equal, round, and reactive to light.   Neck:      Vascular: No JVD.      Trachea: No tracheal deviation.   Cardiovascular:      Rate and Rhythm: Normal rate and regular rhythm.      Heart sounds: Normal heart sounds. No murmur heard.  Pulmonary:      Effort: Pulmonary effort is normal. No respiratory distress.      Breath sounds: Normal breath sounds. No wheezing.   Abdominal:      General: Bowel sounds are normal.      Palpations: Abdomen is soft.      Tenderness: There is no abdominal tenderness.   Musculoskeletal:         General: Swelling (left knee) and tenderness (left knee inferiorly) present.      Cervical back: Normal range of motion and neck supple.      Right knee: Normal.      Left knee: Swelling (mild), effusion (mild) and laceration (healing surgical laceration with staples present) present.      Comments: The patient has no findings to suggest bacterial infection.   Skin:     Coloration: Skin is not pale.      Findings: Lesion (healing surgical incision secondary to left total knee replacement) present. No erythema or rash.   Neurological:      Mental Status: She is alert and oriented to person, place, and time.      Cranial Nerves: No cranial nerve deficit.   Psychiatric:         Behavior: Behavior normal.         Thought Content: Thought content normal.         Procedures           ED Course  ED Course as of 07/03/25 0005   Wed Jul 02, 2025 2113 Patient x-ray results are still pending [BA]   2201 PROCEDURE: X-ray evaluation of the left knee performed on July 2, 2025.  4 images     HISTORY: Knee pain. Post knee  replacement.     COMPARISON: None.     FINDINGS:     Acute oblique fracture across the lower pole of the patella with  distraction of the fracture components  Patella coby noted.  Distraction between the fractured components is estimated at 3.8 cm.  Left total knee arthroplasty with intact arthroplasty components  No increased fluid in the left knee joint.  Skin staples noted along the anterior aspect of the knee consistent with  postoperative change.     IMPRESSION:     Acute oblique fracture across the lower pole of the patella with  distraction of the fracture components up to 3.8 cm  Patella coby noted.  Left total knee arthroplasty with intact arthroplasty components  No lytic or blastic lesion.  Skin staples noted at the anterior vertical midline consistent with  postoperative change.        This report was finalized on 7/2/2025 9:53 PM by Marty Quiros MD.   [BA]   2201 The patient has surgery with [facility] will try to contact on-call Ortho for that clinic. [BA]   2213 Look to the ER physician on-call for [facility].  He recommends placing the patient in a knee immobilizer and call Dr. Harrison's office in the morning.  Will try to PowerShare images to his office.  Additionally, we will put imaging on disc.  [BA]   2232 Getting patient wheelchair left knee support from [facility].  [BA]   2239 The patient has a ride home but we are waiting on [facility] to deliver wheelchair. [BA]      ED Course User Index  [BA] Daniel Godoy PA-C                                                       Medical Decision Making    MDM:    Escalation of care including admission/observation considered    - Discussions of management with other providers: ER provide her [facility]    - Discussed/reviewed with Radiology regarding test interpretation    - Independent interpretation: Patient history and examination findings.    - Additional patient history obtained from: None.    -  Review of external non-ED record (if available): I attempted to find the patient's surgical report.  However it is not available in epic.    - Chronic conditions affecting care: See HPI and medical Hx.    - Social Determinants of health significantly affecting care: None.        Medical Decision Making Discussion:    History of Present Illness  The patient is a 64-year-old female presents to the emergency department for left knee pain.  She states that she had left total knee approximately 2 [place]  She states that she is scheduled for a telehealth appointment with them in a few days.  However today she was physical therapy continue treatment and to have staples removed.  Patient states that while trying to climb up on the exam table she felt a popping sensation in her left knee.  She states that was immediately followed by pain.  She states that due to the recent surgery and nature of pain she felt be good to come to emergency department for evaluation and treatment.    The patient has an extensive past medical history that includes carotid stenosis right, arthritis, GERD, constipation, tremor, asthma, hypertension, dyslipidemia, and carpal tunnel syndrome.  She did have previous arthroscopy of the left knee as well.    Current medications include aspirin, Lipitor, Celebrex, vitamin D, levocetirizine, lisinopril, Singulair, and omeprazole.    Her allergies include amoxicillin, Augmentin, penicillin, and sulfa's.    Given the patient's recent knee replacement and symptoms x-rays were ordered.  The x-rays revealed transverse fracture patella.  The patient is 2 weeks status post replacement with a surgeon jana Wells.  I have attempted to contact the surgeon a without success.  However, was able to speak with the on-call doc at [facility] [place] in [place].  The doctor at [facility] [place] recommends putting the patient in a knee immobilizer supplied her with crutches for ambulation.  He wants  her nonweightbearing till speaking with the surgeon Dr. Salinas in the morning.     The patient has been given very strict return precautions to return to the emergency department should there be any acute change or worsening of their condition.  I have explained my findings and the patient has expressed understanding to me.  I explained that the work-up performed in the ED has been based on the specific complaint and concern, as the nature of emergency medicine is complaint driven and they understand that new symptoms may arise.  I have told them that, should there be any new symptoms, worsening or changing symptoms, a new work-up may be indicated that they are encouraged to return to the emergency department or promptly contact their primary care physician. We have employed a shared decision-making process as the discussion of their disposition.  The patient has been educated as to the nature of the visit, the tests and work-up performed and the findings from today's visit. At this time, there does not appear to be any acute emergent process that necessitates admission to the hospital, however, the patient understands that this can change unexpectedly. At this time, the patient is stable for discharge home and agrees to follow-up with her primary care physician in the next 24 to 48 hours or earlier should they be able to obtain an appointment.    The patient was counseled regarding diagnostic results and treatment plan and patient has indicated understanding of these instructions.     Problems Addressed:  Closed displaced fracture of left patella, unspecified fracture morphology, initial encounter: complicated acute illness or injury  Post-operative state: complicated acute illness or injury    Amount and/or Complexity of Data Reviewed  Radiology: ordered.        Final diagnoses:   Closed displaced fracture of left patella, unspecified fracture morphology, initial encounter   Post-operative state       ED  Disposition  ED Disposition       ED Disposition   Discharge    Condition   Stable    Comment   Patiently placed in a knee immobilizer and advised against weightbearing.  Provide her with crutches to help with ambulation.               Suze Barrera, APRN  [address]  [facility] [address]  [facility] [id number] [phone]    Call in 1 day  Schedule ER follow-up    Marty Salinas MD  [address] [facility] [address]  [place] [id number] [phone]    Call in 1 day  Call for ER follow-up and evaluate patella fracture on postoperative knee         Medication List      No changes were made to your prescriptions during this visit.            Daniel Godoy PA-C  07/03/25 0006

## 2025-07-03 NOTE — ED NOTES
[facility] contacted for wheelchair for pt . Took info and advised will have the rep call back. Provider aware

## 2025-07-03 NOTE — DISCHARGE INSTRUCTIONS
Be sure to take your pain medication to help with pain.  Keep your knee in the immobilizer until you see your orthopedic surgeon Dr. Salinas.  Call Dr. Salinas's office tomorrow to schedule appointment.

## 2025-07-03 NOTE — ED NOTES
Nazte advises that it will be around an hour for a wheelchair   Detail Level: Detailed Procedure To Be Performed At Next Visit: Biopsy by shave method

## (undated) DEVICE — TOTAL TRAY, 16FR 10ML SIL FOLEY, URN: Brand: MEDLINE

## (undated) DEVICE — SUT ETHLN 3-0 FS118IN 663H

## (undated) DEVICE — PENCL E/S HNDSWCH PUSHBTN HOLSTR 10FT

## (undated) DEVICE — Device

## (undated) DEVICE — TUBING, SUCTION, 1/4" X 20', STRAIGHT: Brand: MEDLINE INDUSTRIES, INC.

## (undated) DEVICE — PAD GRND REM POLYHESIVE A/ DISP

## (undated) DEVICE — PK DAVINCI 70

## (undated) DEVICE — DISPOSABLE TOURNIQUET CUFF SINGLE BLADDER, SINGLE PORT AND LUER LOCK CONNECTOR: Brand: COLOR CUFF

## (undated) DEVICE — 3M™ STERI-STRIP™ REINFORCED ADHESIVE SKIN CLOSURES, R1547, 1/2 IN X 4 IN (12 MM X 100 MM), 6 STRIPS/ENVELOPE: Brand: 3M™ STERI-STRIP™

## (undated) DEVICE — BLD CUT FORMLA AGGR PLS 4.0MM

## (undated) DEVICE — SAFESECURE,SECUREMENT,FOLEY CATH,STERILE: Brand: MEDLINE

## (undated) DEVICE — SPNG GZ WOVN 4X4IN 12PLY 10/BX STRL

## (undated) DEVICE — ENCORE® LATEX MICRO SIZE 7.5, STERILE LATEX POWDER-FREE SURGICAL GLOVE: Brand: ENCORE

## (undated) DEVICE — UNDYED BRAIDED (POLYGLACTIN 910), SYNTHETIC ABSORBABLE SUTURE: Brand: COATED VICRYL

## (undated) DEVICE — PK KN ARTHSCP 70

## (undated) DEVICE — WRP COMPR KN COLD UNIV

## (undated) DEVICE — APPL CHLORAPREP W/TINT 26ML ORNG

## (undated) DEVICE — VIOLET BRAIDED (POLYGLACTIN 910), SYNTHETIC ABSORBABLE SUTURE: Brand: COATED VICRYL

## (undated) DEVICE — LAPAROSCOPIC SCOPE WARMER: Brand: DEROYAL

## (undated) DEVICE — BANDAGE,GAUZE,BULKEE II,4.5"X4.1YD,STRL: Brand: MEDLINE

## (undated) DEVICE — HOLDER: Brand: DEROYAL

## (undated) DEVICE — 2, DISPOSABLE SUCTION/IRRIGATOR WITH DISPOSABLE TIP: Brand: STRYKEFLOW

## (undated) DEVICE — SEAL CANN CAM ENDOWRIST DAVINCI/S 8.5MM

## (undated) DEVICE — 40595 XL TRENDELENBURG POSITIONING KIT: Brand: 40595 XL TRENDELENBURG POSITIONING KIT

## (undated) DEVICE — NDL HYPO ECLPS SFTY 18G 1 1/2IN

## (undated) DEVICE — INTENDED FOR TISSUE SEPARATION, AND OTHER PROCEDURES THAT REQUIRE A SHARP SURGICAL BLADE TO PUNCTURE OR CUT.: Brand: BARD-PARKER ® STAINLESS STEEL BLADES

## (undated) DEVICE — SKIN AFFIX SURG ADHESIVE 72/CS 0.55ML: Brand: MEDLINE

## (undated) DEVICE — NDL SPINE 22G 31/2IN BLK

## (undated) DEVICE — ENDOPATH XCEL BLADELESS TROCARS WITH STABILITY SLEEVES: Brand: ENDOPATH XCEL

## (undated) DEVICE — COR MAJOR LITHOTOMY: Brand: MEDLINE INDUSTRIES, INC.

## (undated) DEVICE — BNDG ELAS ELITE V/CLOSE 6IN 5YD LF STRL

## (undated) DEVICE — CONN TBG Y 5 IN 1 LF STRL

## (undated) DEVICE — NDL HYPO ECLPS SFTY 22G 1 1/2IN

## (undated) DEVICE — OBT BLADLES ENDOWRIST DAVINCI/S 8MM

## (undated) DEVICE — ADHS LIQ MASTISOL 2/3ML

## (undated) DEVICE — CYSTO/BLADDER IRRIGATION SET, REGULATING CLAMP

## (undated) DEVICE — SUCTION CANISTER, 1500CC, RIGID: Brand: DEROYAL

## (undated) DEVICE — SUT GUT CHRM 0 CT2 27IN 884H

## (undated) DEVICE — ANTIBACTERIAL VIOLET BRAIDED (POLYGLACTIN 910), SYNTHETIC ABSORBABLE SURGICAL SUTURE: Brand: COATED VICRYL

## (undated) DEVICE — MEDI-VAC YANKAUER SUCTION HANDLE W/BULBOUS TIP: Brand: CARDINAL HEALTH

## (undated) DEVICE — DRSNG ADAPTIC 3X8

## (undated) DEVICE — SUT GUT CHRM 2/0 CT1 36IN 923H

## (undated) DEVICE — TIP COVER ACCESSORY

## (undated) DEVICE — TBG PUMP ARTHSCP MAIN AR6400 16FT

## (undated) DEVICE — CANNULA SEAL

## (undated) DEVICE — BNDG ADHS CURAD FLX/FABRC 2X4IN STRL LF

## (undated) DEVICE — MANIP UTER ADVINCULA DELINEATOR W/ 4CM ULTEM PLSTC CUP